# Patient Record
Sex: FEMALE | Race: WHITE | Employment: UNEMPLOYED | URBAN - METROPOLITAN AREA
[De-identification: names, ages, dates, MRNs, and addresses within clinical notes are randomized per-mention and may not be internally consistent; named-entity substitution may affect disease eponyms.]

---

## 2017-01-17 ENCOUNTER — TELEPHONE (OUTPATIENT)
Dept: INTERNAL MEDICINE CLINIC | Age: 57
End: 2017-01-17

## 2017-01-17 NOTE — TELEPHONE ENCOUNTER
1/17/17 Called  Attempted PA on Rizatriptan 10 mg PA rep Shelli Apley state no PA needed plan allows 18 tablets for 25 days or 9 tablets for 9  days,claim was run too soon ,need to process claim today. Shravan called spoke with susan Holder claim went thru only 3 tablets in stock,patient will be called by Glory.

## 2017-02-13 RX ORDER — RIZATRIPTAN BENZOATE 10 MG/1
TABLET ORAL
Qty: 9 TAB | Refills: 3 | Status: SHIPPED | OUTPATIENT
Start: 2017-02-13 | End: 2017-05-25 | Stop reason: SDUPTHER

## 2017-02-13 NOTE — TELEPHONE ENCOUNTER
Requested Prescriptions     Pending Prescriptions Disp Refills    rizatriptan (MAXALT) 10 mg tablet 9 Tab 3     Sig: May repeat in 2 hours if needed     Last OV-7/27/16  Next OV-n/s  Med refilled-11/25/16

## 2017-04-23 DIAGNOSIS — E03.9 ACQUIRED HYPOTHYROIDISM: ICD-10-CM

## 2017-04-23 RX ORDER — THYROID, PORCINE 30 MG/1
TABLET ORAL
Qty: 30 TAB | Refills: 0 | Status: SHIPPED | OUTPATIENT
Start: 2017-04-23 | End: 2018-01-18

## 2017-05-25 RX ORDER — RIZATRIPTAN BENZOATE 10 MG/1
TABLET ORAL
Qty: 9 TAB | Refills: 3 | Status: SHIPPED | OUTPATIENT
Start: 2017-05-25 | End: 2017-07-27

## 2017-06-19 ENCOUNTER — OFFICE VISIT (OUTPATIENT)
Dept: INTERNAL MEDICINE CLINIC | Age: 57
End: 2017-06-19

## 2017-06-19 VITALS
HEART RATE: 100 BPM | OXYGEN SATURATION: 99 % | HEIGHT: 66 IN | SYSTOLIC BLOOD PRESSURE: 123 MMHG | BODY MASS INDEX: 22.02 KG/M2 | RESPIRATION RATE: 18 BRPM | TEMPERATURE: 98.1 F | WEIGHT: 137 LBS | DIASTOLIC BLOOD PRESSURE: 77 MMHG

## 2017-06-19 DIAGNOSIS — R31.9 HEMATURIA: ICD-10-CM

## 2017-06-19 DIAGNOSIS — R10.9 FLANK PAIN: Primary | ICD-10-CM

## 2017-06-19 LAB
BILIRUB UR QL STRIP: NEGATIVE
GLUCOSE UR-MCNC: NEGATIVE MG/DL
KETONES P FAST UR STRIP-MCNC: NEGATIVE MG/DL
PH UR STRIP: 7 [PH] (ref 4.6–8)
PROT UR QL STRIP: NEGATIVE MG/DL
SP GR UR STRIP: 1.02 (ref 1–1.03)
UA UROBILINOGEN AMB POC: NORMAL (ref 0.2–1)
URINALYSIS CLARITY POC: CLEAR
URINALYSIS COLOR POC: YELLOW
URINE BLOOD POC: NORMAL
URINE LEUKOCYTES POC: NEGATIVE
URINE NITRITES POC: NEGATIVE

## 2017-06-19 NOTE — MR AVS SNAPSHOT
Visit Information Date & Time Provider Department Dept. Phone Encounter #  
 6/19/2017  2:45 PM Toan Morrissey [address] [place] [address],[address] [phone] [id number] Follow-up Instructions Return if symptoms worsen or fail to improve. Your Appointments 7/27/2017  2:30 PM  
PHYSICAL PRE OP with June Jang MD  
[facility] [place]) Appt Note: annual exam  
 [facility] [address] [address]  
[address] [facility] [address] [place] [address] Upcoming Health Maintenance Date Due Hepatitis C Screening 1960 DTaP/Tdap/Td series (1 - Tdap) 4/21/1981 PAP AKA CERVICAL CYTOLOGY 4/21/1981 FOBT Q 1 YEAR AGE 50-75 4/21/2010 BREAST CANCER SCRN MAMMOGRAM 8/26/2016 INFLUENZA AGE 9 TO ADULT 8/1/2017 Allergies as of 6/19/2017  Review Complete On: 6/19/2017 By: Dani Patient Severity Noted Reaction Type Reactions Adhesive  08/03/2012    Hives Folic Acid  17/04/7498    Other (comments) Chest pain  
 Sulfa (Sulfonamide Antibiotics)  12/15/2014    Nausea and Vomiting Sulfites  07/27/2016   Intolerance Other (comments) Migraine Current Immunizations  Never Reviewed No immunizations on file. Not reviewed this visit You Were Diagnosed With   
  
 Codes Comments Flank pain    -  Primary ICD-10-CM: R10.9 ICD-9-CM: 789.09 Hematuria     ICD-10-CM: R31.9 ICD-9-CM: 599.70 Vitals BP Pulse Temp Resp Height(growth percentile) Weight(growth percentile) 123/77 (BP 1 Location: Right arm, BP Patient Position: Sitting) 100 98.1 °F (36.7 °C) (Oral) 18 5' 5.5\" (1.664 m) 137 lb (62.1 kg) LMP SpO2 BMI OB Status Smoking Status 01/14/2013 99% 22.45 kg/m2 Hysterectomy Former Smoker Vitals History BMI and BSA Data Body Mass Index Body Surface Area  
 22.45 kg/m 2 1.69 m 2 Preferred Pharmacy Pharmacy Name Phone [phone] [phone] Your Updated Medication List  
  
   
This list is accurate as of: 6/19/17  3:11 PM.  Always use your most recent med list.  
  
  
  
  
 magnesium 250 mg Tab Take  by mouth. OTHER(NON-FORMULARY)  
  
 rizatriptan 10 mg tablet Commonly known as:  MAXALT  
take 1 tablet by mouth immediately FOR MIGRAINES may repeat after 2 hours * thyroid (Pork) 15 mg tablet Commonly known as:  ARMOUR THYROID Take 1 Tab by mouth daily. Take one tablet twice a week, in addition to the 30mg tablet * ARMOUR THYROID 30 mg tablet Generic drug:  thyroid (Pork)  
take 1 tablet once daily VITAMIN C 500 mg tablet Generic drug:  ascorbic acid (vitamin C) Take  by mouth. * Notice: This list has 2 medication(s) that are the same as other medications prescribed for you. Read the directions carefully, and ask your doctor or other care provider to review them with you. We Performed the Following AMB POC URINALYSIS DIP STICK AUTO W/O MICRO [44790 CPT(R)] CBC WITH AUTOMATED DIFF [81467 CPT(R)] METABOLIC PANEL, COMPREHENSIVE [75127 CPT(R)] Follow-up Instructions Return if symptoms worsen or fail to improve. To-Do List   
 06/19/2017 Imaging:  CT PELV W WO CONT Referral Information Referral ID Referred By Referred To  
  
 [id number] [facility] [place] Not Available Visits Status Start Date End Date 1 New Request 6/19/17 6/19/18 If your referral has a status of pending review or denied, additional information will be sent to support the outcome of this decision. Patient Instructions Blood in the Urine: Care Instructions Your Care Instructions Blood in the urine, or hematuria, may make the urine look red, brown, or pink. There may be blood every time you urinate or just from time to time. You cannot always see blood in the urine, but it will show up in a urine test. 
Blood in the urine may be serious. It should always be checked by a doctor. Your doctor may recommend more tests, including an X-ray, a CT scan, or a cystoscopy (which lets a doctor look inside the urethra and bladder). Blood in the urine can be a sign of another problem. Common causes are bladder infections and kidney stones. An injury to your groin or your genital area can also cause bleeding in the urinary tract. Very hard exercisesuch as running a marathoncan cause blood in the urine. Blood in the urine can also be a sign of kidney disease or cancer in the bladder or kidney. Many cases of blood in the urine are caused by a harmless condition that runs in families. This is called benign familial hematuria. It does not need any treatment. Sometimes your urine may look red or brown even though it does not contain blood. For example, not getting enough fluids (dehydration), taking certain medicines, or having a liver problem can change the color of your urine. Eating foods such as beets, rhubarb, or blackberries or foods with red food coloring can make your urine look red or pink. Follow-up care is a key part of your treatment and safety. Be sure to make and go to all appointments, and call your doctor if you are having problems. It's also a good idea to know your test results and keep a list of the medicines you take. When should you call for help? Call your doctor now or seek immediate medical care if: 
· You have symptoms of a urinary infection. For example: ¨ You have pus in your urine. ¨ You have pain in your back just below your rib cage. This is called flank pain. ¨ You have a fever, chills, or body aches. ¨ It hurts to urinate. ¨ You have groin or belly pain. · You have more blood in your urine. Watch closely for changes in your health, and be sure to contact your doctor if: 
· You have new urination problems. · You do not get better as expected. Where can you learn more? Go to http://ashley-cornelia.info/. Enter G649 in the search box to learn more about \"Blood in the Urine: Care Instructions. \" Current as of: August 12, 2016 Content Version: 11.2 © 2170-6219 [employer]. Care instructions adapted under license by [employer] (which disclaims liability or warranty for this information). If you have questions about a medical condition or this instruction, always ask your healthcare professional. [address] any warranty or liability for your use of this information. 
 
-------------------------------------------------- 
Repeat urinalysis at follow up appointment Introducing [facility] SERVICES! Dear Shyam Trinh: 
Thank you for requesting a [employer] account. Our records indicate that you already have an active [employer] account. You can access your account anytime at [email or web address] Did you know that you can access your hospital and ER discharge instructions at any time in [employer]? You can also review all of your test results from your hospital stay or ER visit. Additional Information If you have questions, please visit the Frequently Asked Questions section of the [employer] website at [email or web address] Remember, [employer] is NOT to be used for urgent needs. For medical emergencies, dial 911. Now available from your iPhone and Android! Please provide this summary of care documentation to your next provider. Your primary care clinician is listed as Mary Matt. If you have any questions after today's visit, please call [phone].

## 2017-06-19 NOTE — PROGRESS NOTES
Chief Complaint   Patient presents with    Flank Pain     x2 week     1. Have you been to the ER, urgent care clinic since your last visit? Hospitalized since your last visit? No    2. Have you seen or consulted any other health care providers outside of the [address] [place] [address] since your last visit? Include any pap smears or colon screening.  No

## 2017-06-19 NOTE — PROGRESS NOTES
CC: Flank Pain (x2 week)      HPI:    She is a 62 y.o. female who presents for evaluation of flank pain  B/L -   Moving exacerbates pain and lies down makes it worst  Pain is constant and sharp and present for 2 weeks   No burning with urination. Appetite is unchanged but has mild nausea    Had trace blood on UA   Previous kidney US done in 2016 and normal   Denies MSK injury   has not taken any medications for this     ROS:  Constitutional: negative for fevers, chills, anorexia and weight loss  Eyes:   negative for visual disturbance,  irritation  ENT:   negative for tinnitus,sore throat,nasal congestion,ear pain, sinus pain. 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 Take one tablet twice a week, in addition to the 30mg tablet 8 Tab 1    ascorbic acid (VITAMIN C) 500 mg tablet Take  by mouth.  magnesium 250 mg Tab Take  by mouth.  OTHER,NON-FORMULARY,        No current facility-administered medications on file prior to visit.         Past Surgical History:   Procedure Laterality Date    HX BREAST AUGMENTATION  2000    saline implants    HX BREAST AUGMENTATION  6422    silicone implants    HX BREAST AUGMENTATION Bilateral 12/18/2014    REMOVAL OF BILATERAL BREAST IMPLANTS, CAPSULECTOMY, LIPOSUCTION OF ABDOMEN, FLANK, THIGHS WITH FAT TRANSFER TO BREASTS performed by Diane Pritchett MD at [address] HX COLONOSCOPY      colon polyps    HX HEENT      CHIN REDUCTION    HX LATRICE AND BSO  2012       Family History   Problem Relation Age of Onset    Cancer Father      ALL OVER     Reviewed and no changes     Social History     Social History    Marital status:      Spouse name: N/A    Number of children: N/A    Years of education: N/A     Occupational History    Not on file.      Social History Main Topics    Smoking status: Former Smoker     Packs/day: 0.50     Years: 30.00    Smokeless tobacco: Never Used    Alcohol use No    Drug use: No    Sexual activity: Not on file     Other Topics Concern    Not on file     Social History Narrative            Visit Vitals    /77 (BP 1 Location: Right arm, BP Patient Position: Sitting)    Pulse 100    Temp 98.1 °F (36.7 °C) (Oral)    Resp 18    Ht 5' 5.5\" (1.664 m)    Wt 137 lb (62.1 kg)    LMP 01/14/2013    SpO2 99%    BMI 22.45 kg/m2       Physical Examination:   General - Well appearing female  HEENT - PERRL, TM no erythema/opacification, normal nasal turbinates, oropharynx no erythema or exudate, MMM  Neck - supple, no bruits, no TMG, no LAD  Pulm - clear to auscultation bilaterally  Cardio - RRR, normal S1 S2, no murmur gallops or rubs  Abd - soft, nontender, no masses, no HSM  Mild CVA tenderness noted B/L   Extrem - no edema, +2 distal pulses  Psych - normal affect, appropriate mood    Lab Results   Component Value Date/Time    WBC 5.0 11/25/2015 04:17 PM    HGB 13.8 11/25/2015 04:17 PM    HCT 40.8 11/25/2015 04:17 PM    PLATELET 456 36/86/5312 04:17 PM    MCV 87 11/25/2015 04:17 PM     Lab Results   Component Value Date/Time    Sodium 143 11/25/2015 04:17 PM    Potassium 4.9 11/25/2015 04:17 PM    Chloride 101 11/25/2015 04:17 PM    CO2 28 11/25/2015 04:17 PM    Anion gap 7 08/03/2012 10:27 AM Glucose 91 11/25/2015 04:17 PM    BUN 12 11/25/2015 04:17 PM    Creatinine 0.81 11/25/2015 04:17 PM    BUN/Creatinine ratio 15 11/25/2015 04:17 PM    GFR est AA 95 11/25/2015 04:17 PM    GFR est non-AA 82 11/25/2015 04:17 PM    Calcium 9.7 11/25/2015 04:17 PM     Lab Results   Component Value Date/Time    Cholesterol, total 293 03/14/2013 09:41 AM    HDL Cholesterol 55 03/14/2013 09:41 AM    LDL, calculated 202 03/14/2013 09:41 AM    VLDL, calculated 36 03/14/2013 09:41 AM    Triglyceride 182 03/14/2013 09:41 AM     Lab Results   Component Value Date/Time    TSH 4.730 09/22/2016 03:24 PM     No results found for: PSA, PSA2, PSAR1, Radene Picking, [id number], [id number], PSALT  Lab Results   Component Value Date/Time    Hemoglobin A1c 5.9 09/11/2012 12:40 PM     Lab Results   Component Value Date/Time    VITAMIN D, 25-HYDROXY 52.9 03/14/2013 09:41 AM       Lab Results   Component Value Date/Time    ALT (SGPT) 11 11/25/2015 04:17 PM    AST (SGOT) 18 11/25/2015 04:17 PM    Alk. phosphatase 60 11/25/2015 04:17 PM    Bilirubin, total 0.4 11/25/2015 04:17 PM           Assessment/Plan:    1. Flank pain with hematuria   - concerning for possible renal stones will obtain CT scan for further evaluation   - also obtaining general labs   - METABOLIC PANEL, COMPREHENSIVE  - CBC WITH AUTOMATED DIFF  - AMB POC URINALYSIS DIP STICK AUTO W/O MICRO  - CT ABD PELV WO CONT; Future    2. Hematuria  - if above work is negative patient will need further investigation for hematuria  - advised to repeat UA at follow up appointment with PCP next month   L  Follow-up Disposition:  Return if symptoms worsen or fail to improve.     Cali Landin MD

## 2017-06-19 NOTE — PATIENT INSTRUCTIONS

## 2017-06-20 LAB
ALBUMIN SERPL-MCNC: 4.7 G/DL (ref 3.5–5.5)
ALBUMIN/GLOB SERPL: 2 {RATIO} (ref 1.2–2.2)
ALP SERPL-CCNC: 63 IU/L (ref 39–117)
ALT SERPL-CCNC: 17 IU/L (ref 0–32)
AST SERPL-CCNC: 22 IU/L (ref 0–40)
BASOPHILS # BLD AUTO: 0 X10E3/UL (ref 0–0.2)
BASOPHILS NFR BLD AUTO: 1 %
BILIRUB SERPL-MCNC: 0.5 MG/DL (ref 0–1.2)
BUN SERPL-MCNC: 11 MG/DL (ref 6–24)
BUN/CREAT SERPL: 14 (ref 9–23)
CALCIUM SERPL-MCNC: 9.6 MG/DL (ref 8.7–10.2)
CHLORIDE SERPL-SCNC: 101 MMOL/L (ref 96–106)
CO2 SERPL-SCNC: 24 MMOL/L (ref 18–29)
CREAT SERPL-MCNC: 0.81 MG/DL (ref 0.57–1)
EOSINOPHIL # BLD AUTO: 0.1 X10E3/UL (ref 0–0.4)
EOSINOPHIL NFR BLD AUTO: 2 %
ERYTHROCYTE [DISTWIDTH] IN BLOOD BY AUTOMATED COUNT: 14.8 % (ref 12.3–15.4)
GLOBULIN SER CALC-MCNC: 2.4 G/DL (ref 1.5–4.5)
GLUCOSE SERPL-MCNC: 89 MG/DL (ref 65–99)
HCT VFR BLD AUTO: 42.2 % (ref 34–46.6)
HGB BLD-MCNC: 14.3 G/DL (ref 11.1–15.9)
IMM GRANULOCYTES # BLD: 0 X10E3/UL (ref 0–0.1)
IMM GRANULOCYTES NFR BLD: 0 %
LYMPHOCYTES # BLD AUTO: 2.7 X10E3/UL (ref 0.7–3.1)
LYMPHOCYTES NFR BLD AUTO: 45 %
MCH RBC QN AUTO: 29 PG (ref 26.6–33)
MCHC RBC AUTO-ENTMCNC: 33.9 G/DL (ref 31.5–35.7)
MCV RBC AUTO: 86 FL (ref 79–97)
MONOCYTES # BLD AUTO: 0.4 X10E3/UL (ref 0.1–0.9)
MONOCYTES NFR BLD AUTO: 7 %
NEUTROPHILS # BLD AUTO: 2.6 X10E3/UL (ref 1.4–7)
NEUTROPHILS NFR BLD AUTO: 45 %
PLATELET # BLD AUTO: 225 X10E3/UL (ref 150–379)
POTASSIUM SERPL-SCNC: 4.4 MMOL/L (ref 3.5–5.2)
PROT SERPL-MCNC: 7.1 G/DL (ref 6–8.5)
RBC # BLD AUTO: 4.93 X10E6/UL (ref 3.77–5.28)
SODIUM SERPL-SCNC: 141 MMOL/L (ref 134–144)
WBC # BLD AUTO: 5.8 X10E3/UL (ref 3.4–10.8)

## 2017-06-21 ENCOUNTER — HOSPITAL ENCOUNTER (OUTPATIENT)
Dept: CT IMAGING | Age: 57
Discharge: HOME | End: 2017-06-21
Attending: INTERNAL MEDICINE
Payer: COMMERCIAL

## 2017-06-21 DIAGNOSIS — R31.9 HEMATURIA: ICD-10-CM

## 2017-06-21 DIAGNOSIS — R10.9 FLANK PAIN: ICD-10-CM

## 2017-06-21 PROCEDURE — 74176 CT ABD & PELVIS W/O CONTRAST: CPT

## 2017-06-22 NOTE — PROGRESS NOTES
No renal stones. Normal pelvic and abdominal area  L3-L4 degenerative disc disease which is likely reason for back pain. Patient can take tylenol alternating with NSAIDs  Such as Motrin for pain . Noted to have atherosclerosis on aorta ( large blood vessel in abdomen) which caused by cholesterol /plaque build up. This is an incidental finding and common at this age.  Recommend healthy diet and daily exercise

## 2017-06-26 NOTE — PROGRESS NOTES
Call to pt, ID verified x2. Results reviewed. Pt verbalized understanding and stated that she sent a message through [email or web address] this afternoon stating her back pain has increased in intensity and duration. Message was forwarded to her PCP Dr. Layla Ruiz for review. Pt had no further questions or concerns.

## 2017-07-27 ENCOUNTER — OFFICE VISIT (OUTPATIENT)
Dept: INTERNAL MEDICINE CLINIC | Age: 57
End: 2017-07-27

## 2017-07-27 VITALS
HEART RATE: 81 BPM | HEIGHT: 66 IN | OXYGEN SATURATION: 97 % | DIASTOLIC BLOOD PRESSURE: 69 MMHG | RESPIRATION RATE: 18 BRPM | WEIGHT: 135.8 LBS | SYSTOLIC BLOOD PRESSURE: 105 MMHG | BODY MASS INDEX: 21.83 KG/M2 | TEMPERATURE: 97.8 F

## 2017-07-27 DIAGNOSIS — E03.9 ACQUIRED HYPOTHYROIDISM: ICD-10-CM

## 2017-07-27 DIAGNOSIS — Z00.00 ROUTINE MEDICAL EXAM: Primary | ICD-10-CM

## 2017-07-27 DIAGNOSIS — Z12.11 SCREENING FOR COLON CANCER: ICD-10-CM

## 2017-07-27 DIAGNOSIS — F51.01 PRIMARY INSOMNIA: ICD-10-CM

## 2017-07-27 DIAGNOSIS — G43.909 MIGRAINE WITHOUT STATUS MIGRAINOSUS, NOT INTRACTABLE, UNSPECIFIED MIGRAINE TYPE: ICD-10-CM

## 2017-07-27 DIAGNOSIS — K63.5 POLYP OF COLON, UNSPECIFIED PART OF COLON, UNSPECIFIED TYPE: ICD-10-CM

## 2017-07-27 DIAGNOSIS — M54.5 LOW BACK PAIN, UNSPECIFIED BACK PAIN LATERALITY, UNSPECIFIED CHRONICITY, WITH SCIATICA PRESENCE UNSPECIFIED: ICD-10-CM

## 2017-07-27 RX ORDER — DIAZEPAM 5 MG/1
TABLET ORAL
Qty: 30 TAB | Refills: 1 | Status: SHIPPED | OUTPATIENT
Start: 2017-07-27 | End: 2017-09-22 | Stop reason: SDUPTHER

## 2017-07-27 RX ORDER — MULTIVIT WITH IRON,MINERALS
400 TABLET ORAL EVERY OTHER DAY
COMMUNITY

## 2017-07-27 RX ORDER — RIZATRIPTAN BENZOATE 10 MG/1
TABLET, ORALLY DISINTEGRATING ORAL
Qty: 12 TAB | Refills: 3 | Status: SHIPPED | OUTPATIENT
Start: 2017-07-27 | End: 2017-12-19 | Stop reason: SDUPTHER

## 2017-07-27 NOTE — MR AVS SNAPSHOT
Visit Information Date & Time Provider Department Dept. Phone Encounter #  
 7/27/2017  2:30 PM Paula Cruz, [address] [place] [address],[address] [phone] [id number] Follow-up Instructions Return for follow up as needed and annual.  . Upcoming Health Maintenance Date Due Hepatitis C Screening 1960 DTaP/Tdap/Td series (1 - Tdap) 4/21/1981 PAP AKA CERVICAL CYTOLOGY 4/21/1981 FOBT Q 1 YEAR AGE 50-75 4/21/2010 BREAST CANCER SCRN MAMMOGRAM 8/26/2016 INFLUENZA AGE 9 TO ADULT 8/1/2017 Allergies as of 7/27/2017  Review Complete On: 7/27/2017 By: Willie RINCON Rash, LPN Severity Noted Reaction Type Reactions Adhesive  08/03/2012    Hives Folic Acid  60/02/8886    Other (comments) Chest pain  
 Sulfa (Sulfonamide Antibiotics)  12/15/2014    Nausea and Vomiting Sulfites  07/27/2016   Intolerance Other (comments) Migraine Current Immunizations  Never Reviewed No immunizations on file. Not reviewed this visit You Were Diagnosed With   
  
 Codes Comments Routine medical exam    -  Primary ICD-10-CM: Z00.00 ICD-9-CM: V70.0 Primary insomnia     ICD-10-CM: F51.01 
ICD-9-CM: 307.42 Low back pain, unspecified back pain laterality, unspecified chronicity, with sciatica presence unspecified     ICD-10-CM: M54.5 ICD-9-CM: 724.2 Screening for colon cancer     ICD-10-CM: Z12.11 ICD-9-CM: V76.51 Polyp of colon, unspecified part of colon, unspecified type     ICD-10-CM: K63.5 ICD-9-CM: 211.3 Acquired hypothyroidism     ICD-10-CM: E03.9 ICD-9-CM: 244.9 Migraine without status migrainosus, not intractable, unspecified migraine type     ICD-10-CM: G43.909 ICD-9-CM: 346.90 Vitals BP Pulse Temp Resp Height(growth percentile) Weight(growth percentile) 105/69 (BP 1 Location: Left arm, BP Patient Position: Sitting) 81 97.8 °F (36.6 °C) (Oral) 18 5' 5.5\" (1.664 m) 135 lb 12.8 oz (61.6 kg) LMP SpO2 BMI OB Status Smoking Status 01/14/2013 97% 22.25 kg/m2 Hysterectomy Former Smoker BMI and BSA Data Body Mass Index Body Surface Area  
 22.25 kg/m 2 1.69 m 2 Preferred Pharmacy Pharmacy Name Phone [id number] [id number] [phone] Your Updated Medication List  
  
   
This list is accurate as of: 7/27/17  3:59 PM.  Always use your most recent med list.  
  
  
  
  
 diazePAM 5 mg tablet Commonly known as:  VALIUM Take one tablet at bedtime as needed for insomnia or back pain.  
  
 magnesium 250 mg Tab Take  by mouth. nicotinic acid 100 mg tablet Commonly known as:  NIACIN Take 400 mg by mouth every other day. OTHER(NON-FORMULARY)  
  
 rizatriptan 10 mg disintegrating tablet Commonly known as:  MAXALT-MLT Take one tablet at onset of headache, repeat once at 2 hours, max of 2 in 24 hours. * thyroid (Pork) 15 mg tablet Commonly known as:  ARMOUR THYROID Take 1 Tab by mouth daily. Take one tablet twice a week, in addition to the 30mg tablet * ARMOUR THYROID 30 mg tablet Generic drug:  thyroid (Pork)  
take 1 tablet once daily VITAMIN C 500 mg tablet Generic drug:  ascorbic acid (vitamin C) Take  by mouth. * Notice: This list has 2 medication(s) that are the same as other medications prescribed for you. Read the directions carefully, and ask your doctor or other care provider to review them with you. Prescriptions Printed Refills  
 diazePAM (VALIUM) 5 mg tablet 1 Sig: Take one tablet at bedtime as needed for insomnia or back pain. Class: Print Prescriptions Sent to Pharmacy Refills  
 rizatriptan (MAXALT-MLT) 10 mg disintegrating tablet 3 Sig: Take one tablet at onset of headache, repeat once at 2 hours, max of 2 in 24 hours.   
 Class: Normal  
 Pharmacy: [address] [place] [address] [facility] [phone]  #: [phone] We Performed the Following REFERRAL TO GASTROENTEROLOGY [HIG96 Custom] Comments:  
 Colon screening, prior polyps. REFERRAL TO NEUROLOGY [XOV56 Custom] Comments:  
 Chronic migraine. Follow-up Instructions Return for follow up as needed and annual.  . Referral Information Referral ID Referred By Referred To  
  
 [id number] [facility] [address] [place], [address] [facility] Visits Status Start Date End Date 1 New Request 7/27/17 7/27/18 If your referral has a status of pending review or denied, additional information will be sent to support the outcome of this decision. Referral ID Referred By Referred To  
 [id number] Estrella DONALDSON [facility] [address] [place] [address] Phone: [phone] Fax: [phone] Visits Status Start Date End Date 1 New Request 7/27/17 7/27/18 If your referral has a status of pending review or denied, additional information will be sent to support the outcome of this decision. Patient Instructions Low Back Pain: Exercises Your Care Instructions Here are some examples of typical rehabilitation exercises for your condition. Start each exercise slowly. Ease off the exercise if you start to have pain. Your doctor or physical therapist will tell you when you can start these exercises and which ones will work best for you. How to do the exercises Press-up 1. Lie on your stomach, supporting your body with your forearms. 2. Press your elbows down into the floor to raise your upper back. As you do this, relax your stomach muscles and allow your back to arch without using your back muscles. As your press up, do not let your hips or pelvis come off the floor. 3. Hold for 15 to 30 seconds, then relax. 4. Repeat 2 to 4 times. Alternate arm and leg (bird dog) exercise Note: Do this exercise slowly. Try to keep your body straight at all times, and do not let one hip drop lower than the other. 1. Start on the floor, on your hands and knees. 2. Tighten your belly muscles. 3. Raise one leg off the floor, and hold it straight out behind you. Be careful not to let your hip drop down, because that will twist your trunk. 4. Hold for about 6 seconds, then lower your leg and switch to the other leg. 5. Repeat 8 to 12 times on each leg. 6. Over time, work up to holding for 10 to 30 seconds each time. 7. If you feel stable and secure with your leg raised, try raising the opposite arm straight out in front of you at the same time. Knee-to-chest exercise 1. Lie on your back with your knees bent and your feet flat on the floor. 2. Bring one knee to your chest, keeping the other foot flat on the floor (or keeping the other leg straight, whichever feels better on your lower back). 3. Keep your lower back pressed to the floor. Hold for at least 15 to 30 seconds. 4. Relax, and lower the knee to the starting position. 5. Repeat with the other leg. Repeat 2 to 4 times with each leg. 6. To get more stretch, put your other leg flat on the floor while pulling your knee to your chest. 
Curl-ups 1. Lie on the floor on your back with your knees bent at a 90-degree angle. Your feet should be flat on the floor, about 12 inches from your buttocks. 2. Cross your arms over your chest. If this bothers your neck, try putting your hands behind your neck (not your head), with your elbows spread apart. 3. Slowly tighten your belly muscles and raise your shoulder blades off the floor. 4. Keep your head in line with your body, and do not press your chin to your chest. 
5. Hold this position for 1 or 2 seconds, then slowly lower yourself back down to the floor. 6. Repeat 8 to 12 times. Pelvic tilt exercise 1. Lie on your back with your knees bent. 2. \"Brace\" your stomach. This means to tighten your muscles by pulling in and imagining your belly button moving toward your spine. You should feel like your back is pressing to the floor and your hips and pelvis are rocking back. 3. Hold for about 6 seconds while you breathe smoothly. 4. Repeat 8 to 12 times. Heel dig bridging 1. Lie on your back with both knees bent and your ankles bent so that only your heels are digging into the floor. Your knees should be bent about 90 degrees. 2. Then push your heels into the floor, squeeze your buttocks, and lift your hips off the floor until your shoulders, hips, and knees are all in a straight line. 3. Hold for about 6 seconds as you continue to breathe normally, and then slowly lower your hips back down to the floor and rest for up to 10 seconds. 4. Do 8 to 12 repetitions. Hamstring stretch in doorway 1. Lie on your back in a doorway, with one leg through the open door. 2. Slide your leg up the wall to straighten your knee. You should feel a gentle stretch down the back of your leg. 3. Hold the stretch for at least 15 to 30 seconds. Do not arch your back, point your toes, or bend either knee. Keep one heel touching the floor and the other heel touching the wall. 4. Repeat with your other leg. 5. Do 2 to 4 times for each leg. Hip flexor stretch 1. Kneel on the floor with one knee bent and one leg behind you. Place your forward knee over your foot. Keep your other knee touching the floor. 2. Slowly push your hips forward until you feel a stretch in the upper thigh of your rear leg. 3. Hold the stretch for at least 15 to 30 seconds. Repeat with your other leg. 4. Do 2 to 4 times on each side. Wall sit 1. Stand with your back 10 to 12 inches away from a wall. 2. Lean into the wall until your back is flat against it. 3. Slowly slide down until your knees are slightly bent, pressing your lower back into the wall. 4. Hold for about 6 seconds, then slide back up the wall. 5. Repeat 8 to 12 times. Follow-up care is a key part of your treatment and safety. Be sure to make and go to all appointments, and call your doctor if you are having problems. It's also a good idea to know your test results and keep a list of the medicines you take. Where can you learn more? Go to http://ashley-cornelia.info/. Enter I736 in the search box to learn more about \"Low Back Pain: Exercises. \" Current as of: March 21, 2017 Content Version: 11.3 © 9849-5683 [employer]. Care instructions adapted under license by [employer] (which disclaims liability or warranty for this information). If you have questions about a medical condition or this instruction, always ask your healthcare professional. [address] any warranty or liability for your use of this information. Introducing [facility]! Dear Yanci Cruz: 
Thank you for requesting a Rew[employer] account. Our records indicate that you already have an active [email or web address] account. You can access your account anytime at [email or web address] Did you know that you can access your hospital and ER discharge instructions at any time in [email or web address]? You can also review all of your test results from your hospital stay or ER visit. Additional Information If you have questions, please visit the Frequently Asked Questions section of the [email or web address] website at [email or web address] Remember, [email or web address] is NOT to be used for urgent needs. For medical emergencies, dial 911. Now available from your iPhone and Android! Please provide this summary of care documentation to your next provider. Your primary care clinician is listed as Loyd Murray. If you have any questions after today's visit, please call [phone].

## 2017-07-27 NOTE — PATIENT INSTRUCTIONS

## 2017-07-27 NOTE — PROGRESS NOTES
Michelle Lua is a 62 y.o. female  Chief Complaint   Patient presents with    Medication Evaluation     1. Have you been to an emergency room, urgent clinic, or hospitalized since your last visit? NO  If yes, where when, and reason for visit? 2. Have seen or consulted any other health care provider since your last visit? NO  Please include any pap smears or colon screening in this section  If yes, where when, and reason for visit? 6. Do you have an Advanced Directive/ Living Will in place?  YES  If yes, do we have a copy on file YES  If no, would you like information NO

## 2017-07-27 NOTE — PROGRESS NOTES
Pertinent items are noted in HPI. Dougie Wiseman is a 62 y.o. female who presents for annual exam.     Taking nyquil for insomnia. Prior ambien, tolerated. Has back pain, saw chiropractor. No sciatica. Not taking any medication for the pain now. Sometimes it will keep her from sleeping. Can sleep on the day she gets treatment for back pain. Has taken valium for dental anxiety. Doing some stretching regularly, yoga. Treated for hypothyroidism. Reports compliance with medication. Sees ron Andre,  135mg, every 3 months. Normal bowel and bladder. Prior colon polyps, she states she is due for a repeat colon screening. Refuses mammogram.  No family history of BRCA. Chronic neck pain. Has migraines, takes maxalt, effective. Wants to switch to MLT. Prior preventive medications for migraine, not helpful, last neuro evaluation 2015. , refused statins. Low fat diet. Active regularly. 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      Social History Main Topics    Smoking status: Former Smoker     Packs/day: 0.50     Years: 30.00    Smokeless tobacco: Never Used    Alcohol use No    Drug use: No    Sexual activity: Not on file     Other Topics Concern    Not on file     Social History Narrative       Current Outpatient Prescriptions on File Prior to Visit   Medication Sig Dispense Refill    rizatriptan (MAXALT) 10 mg tablet take 1 tablet by mouth immediately FOR MIGRAINES may repeat after 2 hours 9 Tab 3    ARMOUR THYROID 30 mg tablet take 1 tablet once daily 30 Tab 0    thyroid, Pork, (ARMOUR THYROID) 15 mg tablet Take 1 Tab by mouth daily. Take one tablet twice a week, in addition to the 30mg tablet 8 Tab 1    ascorbic acid (VITAMIN C) 500 mg tablet Take  by mouth.  magnesium 250 mg Tab Take  by mouth.  OTHER,NON-FORMULARY,        No current facility-administered medications on file prior to visit. Review of Systems  Pertinent items are noted in HPI. Objective:     Visit Vitals    /69 (BP 1 Location: Left arm, BP Patient Position: Sitting)    Pulse 81    Temp 97.8 °F (36.6 °C) (Oral)    Resp 18    Ht 5' 5.5\" (1.664 m)    Wt 135 lb 12.8 oz (61.6 kg)    LMP 01/14/2013    SpO2 97%    BMI 22.25 kg/m2     Gen: well appearing female  HEENT:   PERRL,normal conjunctiva. External ear and canals normal, TMs no opacification or erythema,  OP no erythema, no exudates, MMM  Neck:  Supple. Thyroid normal size, nontender, without nodules. No masses or LAD  Resp:  No wheezing, no rhonchi, no rales. CV:  RRR, normal S1S2, no murmur. GI: soft, nontender, without masses. No hepatosplenomegaly. Extrem:  +2 pulses, no edema, warm distally      Assessment/Plan:       1. Primary insomnia    - diazePAM (VALIUM) 5 mg tablet; Take one tablet at bedtime as needed for insomnia or back pain. Dispense: 30 Tab; Refill: 1    2. Low back pain, unspecified back pain laterality, unspecified chronicity, with sciatica presence unspecified    - diazePAM (VALIUM) 5 mg tablet; Take one tablet at bedtime as needed for insomnia or back pain. Dispense: 30 Tab; Refill: 1    3. Screening for colon cancer    - REFERRAL TO GASTROENTEROLOGY    4. Polyp of colon, unspecified part of colon, unspecified type    - REFERRAL TO GASTROENTEROLOGY    5. Acquired hypothyroidism- follow up with endo      6.  Migraine without status migrainosus, not intractable, unspecified migraine type    - REFERRAL TO NEUROLOGY  - rizatriptan (MAXALT-MLT) 10 mg disintegrating tablet; Take one tablet at onset of headache, repeat once at 2 hours, max of 2 in 24 hours. Dispense: 12 Tab; Refill: 3    7. Routine medical exam- Recommend heart healthy diet and regular cardiovascular exercise.        Follow-up Disposition: follow up as needed and annual.      Elio Reyesr, MD

## 2017-09-22 DIAGNOSIS — F51.01 PRIMARY INSOMNIA: ICD-10-CM

## 2017-09-22 DIAGNOSIS — M54.5 LOW BACK PAIN, UNSPECIFIED BACK PAIN LATERALITY, UNSPECIFIED CHRONICITY, WITH SCIATICA PRESENCE UNSPECIFIED: ICD-10-CM

## 2017-09-22 RX ORDER — DIAZEPAM 5 MG/1
TABLET ORAL
Qty: 30 TAB | Refills: 1 | OUTPATIENT
Start: 2017-09-22 | End: 2017-10-23 | Stop reason: SDUPTHER

## 2017-09-22 NOTE — TELEPHONE ENCOUNTER
Requested Prescriptions     Pending Prescriptions Disp Refills    diazePAM (VALIUM) 5 mg tablet 30 Tab 1     Sig: Take one tablet at bedtime as needed for insomnia or back pain.      Last fill- 7/27/17  LOV- 7/27/17  Next OV- not scheduled

## 2017-09-22 NOTE — TELEPHONE ENCOUNTER
From: Jesica Preston  To: Chandrakant Young MD  Sent: 9/22/2017 10:20 AM EDT  Subject: Medication Renewal Request    Original authorizing provider: MD Jesica López would like a refill of the following medications:  diazePAM (VALIUM) 5 mg tablet Chandrakant Young MD]    Preferred pharmacy: [facility] - [address] [facility], [address] near [address] [place] [address]    Comment:

## 2017-09-25 NOTE — TELEPHONE ENCOUNTER
Approved Medications  diazePAM (VALIUM) 5 mg tablet  Take one tablet at bedtime as needed for insomnia or back pain. Disp: 30 Tab Refills: 1    Class: Phone In Start: 9/22/2017   For: Primary insomnia, Low back pain, unspecified back pain laterality, unspecified chronicity, with sciatica presence unspecified  Approved by: David Andrade MD  Phoned in Valium to [facility] pharmacy per  request. Luis Antonio Estrada with Mukul Oar who gave [place].

## 2017-10-23 DIAGNOSIS — M54.5 LOW BACK PAIN, UNSPECIFIED BACK PAIN LATERALITY, UNSPECIFIED CHRONICITY, WITH SCIATICA PRESENCE UNSPECIFIED: ICD-10-CM

## 2017-10-23 DIAGNOSIS — F51.01 PRIMARY INSOMNIA: ICD-10-CM

## 2017-10-23 NOTE — TELEPHONE ENCOUNTER
Cem Cordova, from [facility] on file, is requesting a refill authorization for \"Diazepam 5 mg\". Best contact [phone].        Message received & copied from [facility]

## 2017-10-24 RX ORDER — DIAZEPAM 5 MG/1
TABLET ORAL
Qty: 30 TAB | Refills: 1 | OUTPATIENT
Start: 2017-10-24 | End: 2018-01-02 | Stop reason: SDUPTHER

## 2017-10-24 NOTE — TELEPHONE ENCOUNTER
Approved Medications  diazePAM (VALIUM) 5 mg tablet  Take one tablet at bedtime as needed for insomnia or back pain.        Disp: 30 Tab Refills: 1    Class: Phone In Start: 10/24/2017   For: Primary insomnia, Low back pain, unspecified back pain laterality, unspecified chronicity, with sciatica presence unspecified  Approved by: Adi Morrison MD  Phoned in Valium to [facility] pharmacy per  request. JAMARCUS

## 2017-10-24 NOTE — TELEPHONE ENCOUNTER
Requested Prescriptions     Pending Prescriptions Disp Refills    diazePAM (VALIUM) 5 mg tablet 30 Tab 1     Sig: Take one tablet at bedtime as needed for insomnia or back pain.      Last fill- 9/22/17  LOV- 7/27/17  Next OV- not scheduled

## 2017-11-18 ENCOUNTER — APPOINTMENT (OUTPATIENT)
Dept: CT IMAGING | Age: 57
End: 2017-11-18
Attending: EMERGENCY MEDICINE
Payer: COMMERCIAL

## 2017-11-18 ENCOUNTER — APPOINTMENT (OUTPATIENT)
Dept: GENERAL RADIOLOGY | Age: 57
End: 2017-11-18
Attending: EMERGENCY MEDICINE
Payer: COMMERCIAL

## 2017-11-18 ENCOUNTER — HOSPITAL ENCOUNTER (EMERGENCY)
Age: 57
Discharge: HOME | End: 2017-11-18
Attending: EMERGENCY MEDICINE
Payer: COMMERCIAL

## 2017-11-18 VITALS
TEMPERATURE: 98.3 F | RESPIRATION RATE: 22 BRPM | BODY MASS INDEX: 21.69 KG/M2 | DIASTOLIC BLOOD PRESSURE: 55 MMHG | HEART RATE: 94 BPM | HEIGHT: 66 IN | WEIGHT: 135 LBS | OXYGEN SATURATION: 98 % | SYSTOLIC BLOOD PRESSURE: 103 MMHG

## 2017-11-18 DIAGNOSIS — R07.9 CHEST PAIN, UNSPECIFIED TYPE: Primary | ICD-10-CM

## 2017-11-18 LAB
ALBUMIN SERPL-MCNC: 3.7 G/DL (ref 3.5–5)
ALBUMIN/GLOB SERPL: 1.2 {RATIO} (ref 1.1–2.2)
ALP SERPL-CCNC: 54 U/L (ref 45–117)
ALT SERPL-CCNC: 23 U/L (ref 12–78)
ANION GAP SERPL CALC-SCNC: 6 MMOL/L (ref 5–15)
APPEARANCE UR: CLEAR
AST SERPL-CCNC: 17 U/L (ref 15–37)
BACTERIA URNS QL MICRO: NEGATIVE /HPF
BASOPHILS # BLD: 0 K/UL (ref 0–0.1)
BASOPHILS NFR BLD: 0 % (ref 0–1)
BILIRUB SERPL-MCNC: 0.5 MG/DL (ref 0.2–1)
BILIRUB UR QL: NEGATIVE
BUN SERPL-MCNC: 17 MG/DL (ref 6–20)
BUN/CREAT SERPL: 20 (ref 12–20)
CALCIUM SERPL-MCNC: 8.5 MG/DL (ref 8.5–10.1)
CHLORIDE SERPL-SCNC: 108 MMOL/L (ref 97–108)
CK SERPL-CCNC: 116 U/L (ref 26–192)
CO2 SERPL-SCNC: 28 MMOL/L (ref 21–32)
COLOR UR: ABNORMAL
CREAT SERPL-MCNC: 0.83 MG/DL (ref 0.55–1.02)
D DIMER PPP FEU-MCNC: <0.17 MG/L FEU (ref 0–0.65)
EOSINOPHIL # BLD: 0.1 K/UL (ref 0–0.4)
EOSINOPHIL NFR BLD: 1 % (ref 0–7)
EPITH CASTS URNS QL MICRO: ABNORMAL /LPF
ERYTHROCYTE [DISTWIDTH] IN BLOOD BY AUTOMATED COUNT: 12.3 % (ref 11.5–14.5)
GLOBULIN SER CALC-MCNC: 3.2 G/DL (ref 2–4)
GLUCOSE SERPL-MCNC: 90 MG/DL (ref 65–100)
GLUCOSE UR STRIP.AUTO-MCNC: NEGATIVE MG/DL
HCT VFR BLD AUTO: 38.7 % (ref 35–47)
HGB BLD-MCNC: 13.3 G/DL (ref 11.5–16)
HGB UR QL STRIP: ABNORMAL
HYALINE CASTS URNS QL MICRO: ABNORMAL /LPF (ref 0–5)
KETONES UR QL STRIP.AUTO: NEGATIVE MG/DL
LEUKOCYTE ESTERASE UR QL STRIP.AUTO: NEGATIVE
LIPASE SERPL-CCNC: 232 U/L (ref 73–393)
LYMPHOCYTES # BLD: 2.2 K/UL (ref 0.8–3.5)
LYMPHOCYTES NFR BLD: 41 % (ref 12–49)
MAGNESIUM SERPL-MCNC: 1.8 MG/DL (ref 1.6–2.4)
MCH RBC QN AUTO: 29.4 PG (ref 26–34)
MCHC RBC AUTO-ENTMCNC: 34.4 G/DL (ref 30–36.5)
MCV RBC AUTO: 85.4 FL (ref 80–99)
MONOCYTES # BLD: 0.3 K/UL (ref 0–1)
MONOCYTES NFR BLD: 6 % (ref 5–13)
NEUTS SEG # BLD: 2.7 K/UL (ref 1.8–8)
NEUTS SEG NFR BLD: 52 % (ref 32–75)
NITRITE UR QL STRIP.AUTO: NEGATIVE
PH UR STRIP: 6 [PH] (ref 5–8)
PLATELET # BLD AUTO: 195 K/UL (ref 150–400)
POTASSIUM SERPL-SCNC: 3.6 MMOL/L (ref 3.5–5.1)
PROT SERPL-MCNC: 6.9 G/DL (ref 6.4–8.2)
PROT UR STRIP-MCNC: NEGATIVE MG/DL
RBC # BLD AUTO: 4.53 M/UL (ref 3.8–5.2)
RBC #/AREA URNS HPF: ABNORMAL /HPF (ref 0–5)
SODIUM SERPL-SCNC: 142 MMOL/L (ref 136–145)
SP GR UR REFRACTOMETRY: 1.02 (ref 1–1.03)
TROPONIN I SERPL-MCNC: <0.04 NG/ML
TROPONIN I SERPL-MCNC: <0.04 NG/ML
UA: UC IF INDICATED,UAUC: ABNORMAL
UROBILINOGEN UR QL STRIP.AUTO: 0.2 EU/DL (ref 0.2–1)
WBC # BLD AUTO: 5.3 K/UL (ref 3.6–11)
WBC URNS QL MICRO: ABNORMAL /HPF (ref 0–4)

## 2017-11-18 PROCEDURE — 74011250636 HC RX REV CODE- 250/636: Performed by: EMERGENCY MEDICINE

## 2017-11-18 PROCEDURE — 82550 ASSAY OF CK (CPK): CPT | Performed by: EMERGENCY MEDICINE

## 2017-11-18 PROCEDURE — 85025 COMPLETE CBC W/AUTO DIFF WBC: CPT | Performed by: EMERGENCY MEDICINE

## 2017-11-18 PROCEDURE — 71275 CT ANGIOGRAPHY CHEST: CPT

## 2017-11-18 PROCEDURE — 85379 FIBRIN DEGRADATION QUANT: CPT | Performed by: EMERGENCY MEDICINE

## 2017-11-18 PROCEDURE — 83690 ASSAY OF LIPASE: CPT | Performed by: EMERGENCY MEDICINE

## 2017-11-18 PROCEDURE — 99284 EMERGENCY DEPT VISIT MOD MDM: CPT

## 2017-11-18 PROCEDURE — 80053 COMPREHEN METABOLIC PANEL: CPT | Performed by: EMERGENCY MEDICINE

## 2017-11-18 PROCEDURE — 71010 XR CHEST PORT: CPT

## 2017-11-18 PROCEDURE — 93005 ELECTROCARDIOGRAM TRACING: CPT

## 2017-11-18 PROCEDURE — 83735 ASSAY OF MAGNESIUM: CPT | Performed by: EMERGENCY MEDICINE

## 2017-11-18 PROCEDURE — 74011636320 HC RX REV CODE- 636/320: Performed by: EMERGENCY MEDICINE

## 2017-11-18 PROCEDURE — 84484 ASSAY OF TROPONIN QUANT: CPT | Performed by: EMERGENCY MEDICINE

## 2017-11-18 PROCEDURE — 36415 COLL VENOUS BLD VENIPUNCTURE: CPT | Performed by: EMERGENCY MEDICINE

## 2017-11-18 PROCEDURE — 81001 URINALYSIS AUTO W/SCOPE: CPT | Performed by: EMERGENCY MEDICINE

## 2017-11-18 RX ORDER — SODIUM CHLORIDE 0.9 % (FLUSH) 0.9 %
10 SYRINGE (ML) INJECTION
Status: COMPLETED | OUTPATIENT
Start: 2017-11-18 | End: 2017-11-18

## 2017-11-18 RX ORDER — SODIUM CHLORIDE 9 MG/ML
50 INJECTION, SOLUTION INTRAVENOUS
Status: COMPLETED | OUTPATIENT
Start: 2017-11-18 | End: 2017-11-18

## 2017-11-18 RX ORDER — SODIUM CHLORIDE 0.9 % (FLUSH) 0.9 %
5-10 SYRINGE (ML) INJECTION AS NEEDED
Status: DISCONTINUED | OUTPATIENT
Start: 2017-11-18 | End: 2017-11-18 | Stop reason: HOSPADM

## 2017-11-18 RX ORDER — SODIUM CHLORIDE 0.9 % (FLUSH) 0.9 %
5-10 SYRINGE (ML) INJECTION EVERY 8 HOURS
Status: DISCONTINUED | OUTPATIENT
Start: 2017-11-18 | End: 2017-11-18 | Stop reason: HOSPADM

## 2017-11-18 RX ORDER — GUAIFENESIN 100 MG/5ML
162 LIQUID (ML) ORAL DAILY
Qty: 100 TAB | Refills: 0 | Status: SHIPPED | OUTPATIENT
Start: 2017-11-18

## 2017-11-18 RX ADMIN — IOPAMIDOL 100 ML: 755 INJECTION, SOLUTION INTRAVENOUS at 15:49

## 2017-11-18 RX ADMIN — Medication 10 ML: at 15:51

## 2017-11-18 RX ADMIN — SODIUM CHLORIDE 50 ML/HR: 900 INJECTION, SOLUTION INTRAVENOUS at 15:51

## 2017-11-18 NOTE — ED PROVIDER NOTES
[facility].  EMERGENCY DEPARTMENT HISTORY AND PHYSICAL EXAM       Date of Service: 11/18/2017   Patient Name: Sonia Chang   YOB: 1960  Medical Record Number: [id number]    History of Presenting Illness     Chief Complaint   Patient presents with    Chest Pain     intermittent midsternal chest pain x 2 weeks, pt reports over the last 2 days pain has become more constant        History Provided By:  patient    Additional History:   Sonia Chang is a 62 y.o. female with PMhx significant for migraines, PAC's and PVC's who presents ambulatory to the ED with cc of intermittent mid-sternal chest pain x 2 weeks. She states the pain woke her from her sleep this morning and has been constant in nature ever since with BUE weakness. Pt reports the sensation feels as though it is traveling up into her neck with associated subjective SOB \"making it difficult to get my words out, like I'm winded\". She states she initially attributed her sx's to indigestion and endorses taking Gas-X and Tums wnr. Pt indicates she has been unable to detect any triggers of the pain and denies any correlation with eating. She denies any recent stressors that could contribute to her current sx's. She notes she had a cardiac work up including a halter test in 2010 and an echocardiogram last month ordered by her PCP. Pt denies hx of DVT/PE as well as recent travel, surgery, prolonged immobilization, and hormonal therapy. She denies any hx of abdominal, esophageal, or pulmonary pathology. Pt denies hx of AAA and denies any pulling/tearing back pain. Pt specifically denies any nausea and diaphoresis. Social Hx: - (former) Tobacco, - EtOH, - Illicit Drugs    There are no other complaints, changes or physical findings at this time.     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    Chronic pain 1970's    Neck pain    Migraine, cervicogenic 1970's    Neurological disorder     migraines        Past Surgical History:   Past Surgical History:   Procedure Laterality Date    HX BREAST AUGMENTATION  2000    saline implants    HX BREAST AUGMENTATION  7775    silicone implants    HX BREAST AUGMENTATION Bilateral 12/18/2014    REMOVAL OF BILATERAL BREAST IMPLANTS, CAPSULECTOMY, LIPOSUCTION OF ABDOMEN, FLANK, THIGHS WITH FAT TRANSFER TO BREASTS performed by Rita Best MD at [facility] [address] HX COLONOSCOPY      colon polyps    HX HEENT      CHIN REDUCTION    HX LATRICE AND BSO  2012        Family History:   Family History   Problem Relation Age of Onset    Cancer Father      metastatic, unknown primary        Social History:   Social History   Substance Use Topics    Smoking status: Former Smoker     Packs/day: 0.50     Years: 30.00    Smokeless tobacco: Never Used    Alcohol use No        Allergies: Allergies   Allergen Reactions    Adhesive Hives    Folic Acid Other (comments)     Chest pain    Sulfa (Sulfonamide Antibiotics) Nausea and Vomiting    Sulfites Other (comments)     Migraine         Review of Systems   Review of Systems   Constitutional: Negative for chills, diaphoresis and fever. HENT: Negative. Negative for congestion and rhinorrhea. Respiratory: Positive for shortness of breath (subjective). Negative for cough, chest tightness and wheezing. Cardiovascular: Positive for chest pain. Negative for palpitations. Gastrointestinal: Negative. Negative for abdominal pain, constipation, nausea and vomiting. Endocrine: Negative. Genitourinary: Negative. Negative for decreased urine volume, flank pain, hematuria and pelvic pain. Musculoskeletal: Positive for neck pain. Negative for back pain. Skin: Negative. Negative for color change, pallor and rash. Neurological: Positive for weakness (BUE).  Negative for dizziness, seizures, numbness and headaches. Hematological: Negative. Negative for adenopathy. Psychiatric/Behavioral: Negative. All other systems reviewed and are negative. Physical Exam  Physical Exam   Constitutional: She is oriented to person, place, and time. She appears well-developed and well-nourished. No distress. HENT:   Head: Normocephalic and atraumatic. Mouth/Throat: No oropharyngeal exudate. Eyes: Conjunctivae are normal. Pupils are equal, round, and reactive to light. Right eye exhibits no discharge. Left eye exhibits no discharge. No scleral icterus. Neck: Normal range of motion. Neck supple. No JVD present. Cardiovascular: Normal rate, regular rhythm, normal heart sounds and intact distal pulses. Exam reveals no gallop and no friction rub. No murmur heard. Pulmonary/Chest: Effort normal and breath sounds normal. No stridor. No respiratory distress. She has no wheezes. She has no rales. She exhibits no tenderness. Abdominal: Soft. Bowel sounds are normal. She exhibits no distension and no mass. There is no tenderness. There is no rebound and no guarding. Neurological: She is alert and oriented to person, place, and time. She displays normal reflexes. No cranial nerve deficit. She exhibits normal muscle tone. Coordination normal.   Skin: Skin is warm. No rash noted. She is not diaphoretic. No pallor. Vitals reviewed. Medical Decision Making   I am the first provider for this patient. I reviewed the vital signs, available nursing notes, past medical history, past surgical history, family history and social history. Old Medical Records: Old medical records. Previous electrocardiograms. Nursing notes. Provider Notes:     DDx: Coronary syndrome, PE, aortic dissection, referred GI pain, PNA, reflux    Impression/plan: Pt presents with atypical chest discomfort and subjective SOB x several weeks that appear to worsen today. Has no significant cardiac risk factors.  Will obtain labs, EKG, trop, and CT. Has had a normal stress test. Will follow up to cardiology if everything is otherwise normal.       ED Course:  1:57 PM  Initial assessment performed. The patients presenting problems have been discussed, and they are in agreement with the care plan formulated and outlined with them. I have encouraged them to ask questions as they arise throughout their visit. Progress Notes:     4:13 PM  Pt has no complaints. Discussed with her the plan of getting CT report and second troponin. If normal will follow up with cardiology and PCP. Initiate baby aspirin. Written by Regino Vargas ED scribe, as dictated by Arslan Roberts MD    SIGN OUT:  4:20 PM  Patient's presentation, labs/imaging and plan of care was reviewed with Bernarda Giraldo DO as part of sign out. They will check pt's secondary troponin and CT as part of the plan discussed with the patient. Bernarda Giraldo DO's assistance in completion of this plan is greatly appreciated but it should be noted that I will be the provider of record for this patient.     Arslan Roberts MD    Diagnostic Study Results     Labs -      Recent Results (from the past 12 hour(s))   EKG, 12 LEAD, INITIAL    Collection Time: 11/18/17  1:36 PM   Result Value Ref Range    Ventricular Rate 92 BPM    Atrial Rate 92 BPM    P-R Interval 136 ms    QRS Duration 86 ms    Q-T Interval 386 ms    QTC Calculation (Bezet) 477 ms    Calculated P Axis 72 degrees    Calculated R Axis 5 degrees    Calculated T Axis 56 degrees    Diagnosis       Normal sinus rhythm  Nonspecific ST abnormality  Abnormal ECG  When compared with ECG of 15-DEC-2014 11:18,  No significant change was found     CBC WITH AUTOMATED DIFF    Collection Time: 11/18/17  2:22 PM   Result Value Ref Range    WBC 5.3 3.6 - 11.0 K/uL    RBC 4.53 3.80 - 5.20 M/uL    HGB 13.3 11.5 - 16.0 g/dL    HCT 38.7 35.0 - 47.0 %    MCV 85.4 80.0 - 99.0 FL    MCH 29.4 26.0 - 34.0 PG    MCHC 34.4 30.0 - 36.5 g/dL    RDW 12.3 11.5 - 14.5 %    PLATELET 763 356 - 510 K/uL    NEUTROPHILS 52 32 - 75 %    LYMPHOCYTES 41 12 - 49 %    MONOCYTES 6 5 - 13 %    EOSINOPHILS 1 0 - 7 %    BASOPHILS 0 0 - 1 %    ABS. NEUTROPHILS 2.7 1.8 - 8.0 K/UL    ABS. LYMPHOCYTES 2.2 0.8 - 3.5 K/UL    ABS. MONOCYTES 0.3 0.0 - 1.0 K/UL    ABS. EOSINOPHILS 0.1 0.0 - 0.4 K/UL    ABS. BASOPHILS 0.0 0.0 - 0.1 K/UL   D DIMER    Collection Time: 11/18/17  2:23 PM   Result Value Ref Range    D-dimer <0.17 0.00 - 0.65 mg/L FEU   LIPASE    Collection Time: 11/18/17  2:23 PM   Result Value Ref Range    Lipase 232 73 - 393 U/L   MAGNESIUM    Collection Time: 11/18/17  2:23 PM   Result Value Ref Range    Magnesium 1.8 1.6 - 2.4 mg/dL   CK W/ REFLX CKMB    Collection Time: 11/18/17  2:23 PM   Result Value Ref Range     26 - 628 U/L   METABOLIC PANEL, COMPREHENSIVE    Collection Time: 11/18/17  2:23 PM   Result Value Ref Range    Sodium 142 136 - 145 mmol/L    Potassium 3.6 3.5 - 5.1 mmol/L    Chloride 108 97 - 108 mmol/L    CO2 28 21 - 32 mmol/L    Anion gap 6 5 - 15 mmol/L    Glucose 90 65 - 100 mg/dL    BUN 17 6 - 20 MG/DL    Creatinine 0.83 0.55 - 1.02 MG/DL    BUN/Creatinine ratio 20 12 - 20      GFR est AA >60 >60 ml/min/1.73m2    GFR est non-AA >60 >60 ml/min/1.73m2    Calcium 8.5 8.5 - 10.1 MG/DL    Bilirubin, total 0.5 0.2 - 1.0 MG/DL    ALT (SGPT) 23 12 - 78 U/L    AST (SGOT) 17 15 - 37 U/L    Alk.  phosphatase 54 45 - 117 U/L    Protein, total 6.9 6.4 - 8.2 g/dL    Albumin 3.7 3.5 - 5.0 g/dL    Globulin 3.2 2.0 - 4.0 g/dL    A-G Ratio 1.2 1.1 - 2.2     TROPONIN I    Collection Time: 11/18/17  2:23 PM   Result Value Ref Range    Troponin-I, Qt. <0.04 <0.05 ng/mL   URINALYSIS W/ REFLEX CULTURE    Collection Time: 11/18/17  3:02 PM   Result Value Ref Range    Color YELLOW/STRAW      Appearance CLEAR CLEAR      Specific gravity 1.020 1.003 - 1.030      pH (UA) 6.0 5.0 - 8.0      Protein NEGATIVE  NEG mg/dL    Glucose NEGATIVE  NEG mg/dL    Ketone NEGATIVE  NEG mg/dL    Bilirubin NEGATIVE  NEG      Blood TRACE (A) NEG      Urobilinogen 0.2 0.2 - 1.0 EU/dL    Nitrites NEGATIVE  NEG      Leukocyte Esterase NEGATIVE  NEG      WBC 0-4 0 - 4 /hpf    RBC 0-5 0 - 5 /hpf    Epithelial cells FEW FEW /lpf    Bacteria NEGATIVE  NEG /hpf    UA:UC IF INDICATED CULTURE NOT INDICATED BY UA RESULT CNI      Hyaline cast 0-2 0 - 5 /lpf   TROPONIN I    Collection Time: 11/18/17  4:22 PM   Result Value Ref Range    Troponin-I, Qt. <0.04 <0.05 ng/mL       Radiologic Studies -  The following have been ordered and reviewed:    CT Results  (Last 48 hours)               11/18/17 1551  CTA CHEST W OR W WO CONT Final result    Impression:  IMPRESSION: Normal CT arteriogram of the chest. No pulmonary embolus. Narrative:  EXAM: CT ANGIOGRAPHY CHEST    INDICATION: Shortness of breath and upper abdominal.   COMPARISON: .   CONTRAST: mL of Isovue-370. TECHNIQUE:    Precontrast  images were obtained to localize the volume for acquisition. Multislice helical CT arteriography was performed from the diaphragm to the   thoracic inlet during uneventful rapid bolus intravenous contrast   administration. Lung and soft tissue windows were generated. Coronal and   sagittal  reformatted images were also generated and 3D post processing   consisting of coronal maximum intensity projection images was performed. CT dose   reduction was achieved through use of a standardized protocol tailored for this   examination and automatic exposure control for dose modulation. FINDINGS:   LOWER NECK: The visualized portions of the thyroid and structures of the lower   neck are within normal limits. LUNGS: The lungs are clear of mass, nodule, airspace disease or edema. Is mild   hypoaeration in the posterior lower lobes. PLEURA: There is no pleural effusion or pneumothorax. PULMONARY ARTERIES: The pulmonary arteries are well enhanced and no pulmonary   emboli are identified. AORTA: The aorta enhances normally without evidence of aneurysm or dissection. MEDIASTINUM: There is no mediastinal or hilar adenopathy or mass. BONES AND SOFT TISSUES: The bones and soft tissues of the chest wall are within   normal limits. UPPER ABDOMEN: The visualized portions of the upper abdominal organs are normal.               CXR Results  (Last 48 hours)               11/18/17 1454  XR CHEST PORT Final result    Impression:  IMPRESSION:       No acute process. Stable exam.           Narrative:  EXAM:  XR CHEST PORT       INDICATION:  And chest pain for 2 weeks. Shortness of breath. COMPARISON: 8/3/2012       TECHNIQUE: Portable AP upright chest view at 1440 hours       FINDINGS: Cardiac monitoring wires overlie the thorax. The cardiomediastinal   contours are stable. The pulmonary vasculature is within normal limits. The lungs and pleural spaces are clear. There is no pneumothorax. The bones and   upper abdomen are stable. Vital Signs-Reviewed the patient's vital signs. Patient Vitals for the past 12 hrs:   Temp Pulse Resp BP SpO2   11/18/17 1812 - 94 22 103/55 98 %   11/18/17 1500 - 79 18 113/65 96 %   11/18/17 1401 - 84 15 134/84 98 %   11/18/17 1342 98.3 °F (36.8 °C) 99 18 122/75 100 %       Medications Given in the ED:  Medications   sodium chloride (NS) flush 5-10 mL (not administered)   sodium chloride (NS) flush 5-10 mL (not administered)   0.9% sodium chloride infusion (0 mL/hr IntraVENous IV Completed 11/18/17 1827)   iopamidol (ISOVUE-370) 76 % injection 100 mL (100 mL IntraVENous Given 11/18/17 1549)   sodium chloride (NS) flush 10 mL (10 mL IntraVENous Given 11/18/17 1551)       Pulse Oximetry Analysis - Normal 100% on RA     Cardiac Monitor:   Rate: 92  Rhythm: Normal Sinus Rhythm     EKG interpretation: (Preliminary)  1336  Rhythm: normal sinus rhythm; and regular .  Rate (approx.): 92; Axis: normal; SC interval: normal; QRS interval: normal ; ST/T wave: nonspecific ST abnormality  Written by Spencer Enriquez, ED Scribe, as dictated by Maggie Garcia MD    Diagnosis   Clinical Impression:   1. Chest pain, unspecified type         Plan:  1: Discharge home  Follow-up Information     Follow up With Details Comments [id number] Jose Miguel Rodney MD Call in 2 days  [address] [facility]  [address] [facility]      Philip Buckley MD Schedule an appointment as soon as possible for a visit in 2 days  [address]  [address]  [address] [phone]      [facility] EMERGENCY DEPT  If symptoms worsen [address] [place] [address]  [phone]        2:   Discharge Medication List as of 11/18/2017  4:14 PM      START taking these medications    Details   aspirin 81 mg chewable tablet Take 2 Tabs by mouth daily. , Print, Disp-100 Tab, R-0         CONTINUE these medications which have NOT CHANGED    Details   nicotinic acid (NIACIN) 100 mg tablet Take 400 mg by mouth every other day., Historical Med      rizatriptan (MAXALT-MLT) 10 mg disintegrating tablet Take one tablet at onset of headache, repeat once at 2 hours, max of 2 in 24 hours. , Normal, Disp-12 Tab, R-3      !! ARMOUR THYROID 30 mg tablet take 1 tablet once daily, NormalIn place of levothyroxine. Disp-30 Tab, R-0      ascorbic acid (VITAMIN C) 500 mg tablet Take  by mouth., Historical Med      magnesium 250 mg Tab Take  by mouth., Historical Med      diazePAM (VALIUM) 5 mg tablet Take one tablet at bedtime as needed for insomnia or back pain. , Phone In, Disp-30 Tab, R-1      !! thyroid, Pork, (ARMOUR THYROID) 15 mg tablet Take 1 Tab by mouth daily. Take one tablet twice a week, in addition to the 30mg tablet, Normal, Disp-8 Tab, R-1      OTHER,NON-FORMULARY, Historical Med       !! - Potential duplicate medications found. Please discuss with provider.         Return to ED if Worse    Disposition Note:    DISCHARGE NOTE:  5:48 PM  The patient is ready for discharge. The patients signs, symptoms, diagnosis, and instructions for discharge have been discussed and the pt has conveyed their understanding. The patient is to follow up as recommended with PCP or return to the ER should their symptoms worsen. Plan has been discussed and patient has conveyed their agreement.    _______________________________   Attestations: This note is prepared by Magdy Allen, acting as Scribe for Lissette Singh MD.    Lissette Singh MD: The scribe's documentation has been prepared under my direction and personally reviewed by me in its entirety.  I confirm that the note above accurately reflects all work, treatment, procedures, and medical decision making performed by me.      _______________________________

## 2017-11-18 NOTE — ED NOTES
MD Liza Tiwari reviewed discharge instructions with the patient. The patient verbalized understanding. Patient discharged home with vitals at baseline. Patient ambulatory to vehicle with slow and steady gait. No further complaints noted by patient at this time.

## 2017-11-18 NOTE — DISCHARGE INSTRUCTIONS
Chest Pain: Care Instructions  Your Care Instructions    There are many things that can cause chest pain. Some are not serious and will get better on their own in a few days. But some kinds of chest pain need more testing and treatment. Your doctor may have recommended a follow-up visit in the next 8 to 12 hours. If you are not getting better, you may need more tests or treatment. Even though your doctor has released you, you still need to watch for any problems. The doctor carefully checked you, but sometimes problems can develop later. If you have new symptoms or if your symptoms do not get better, get medical care right away. If you have worse or different chest pain or pressure that lasts more than 5 minutes or you passed out (lost consciousness), call 911 or seek other emergency help right away. A medical visit is only one step in your treatment. Even if you feel better, you still need to do what your doctor recommends, such as going to all suggested follow-up appointments and taking medicines exactly as directed. This will help you recover and help prevent future problems. How can you care for yourself at home? · Rest until you feel better. · Take your medicine exactly as prescribed. Call your doctor if you think you are having a problem with your medicine. · Do not drive after taking a prescription pain medicine. When should you call for help? Call 911 if:  ? · You passed out (lost consciousness). ? · You have severe difficulty breathing. ? · You have symptoms of a heart attack. These may include:  ¨ Chest pain or pressure, or a strange feeling in your chest.  ¨ Sweating. ¨ Shortness of breath. ¨ Nausea or vomiting. ¨ Pain, pressure, or a strange feeling in your back, neck, jaw, or upper belly or in one or both shoulders or arms. ¨ Lightheadedness or sudden weakness. ¨ A fast or irregular heartbeat.   After you call 911, the  may tell you to chew 1 adult-strength or 2 to 4 low-dose aspirin. Wait for an ambulance. Do not try to drive yourself. ?Call your doctor today if:  ? · You have any trouble breathing. ? · Your chest pain gets worse. ? · You are dizzy or lightheaded, or you feel like you may faint. ? · You are not getting better as expected. ? · You are having new or different chest pain. Where can you learn more? Go to http://ashley-cornelia.info/. Enter A120 in the search box to learn more about \"Chest Pain: Care Instructions. \"  Current as of: 2017  Content Version: 11.4  © 7112-0349 [employer]. Care instructions adapted under license by [employer] (which disclaims liability or warranty for this information). If you have questions about a medical condition or this instruction, always ask your healthcare professional. [address] any warranty or liability for your use of this information. [employer] Activation    Thank you for requesting access to [employer]. Please follow the instructions below to securely access and download your online medical record. [employer] allows you to send messages to your doctor, view your test results, renew your prescriptions, schedule appointments, and more. How Do I Sign Up? 1. In your internet browser, go to [email or web address] [email or web address]  2. Click on the First Time User? Click Here link in the Sign In box. You will be redirect to the New Member Sign Up page. 3. Enter your [employer] Access Code exactly as it appears below. You will not need to use this code after youve completed the sign-up process. If you do not sign up before the expiration date, you must request a new code. [employer] Access Code: Activation code not generated  Current [employer] Status: Active (This is the date your [employer] access code will )    4. Enter the last four digits of your Social Security Number (xxxx) and Date of Birth (mm/dd/yyyy) as indicated and click Submit.  You will be taken to the next sign-up page. 5. Create a [employer] ID. This will be your [employer] login ID and cannot be changed, so think of one that is secure and easy to remember. 6. Create a [employer] password. You can change your password at any time. 7. Enter your Password Reset Question and Answer. This can be used at a later time if you forget your password. 8. Enter your e-mail address. You will receive e-mail notification when new information is available in [address]. 9. Click Sign Up. You can now view and download portions of your medical record. 10. Click the Download Summary menu link to download a portable copy of your medical information. Additional Information    If you have questions, please visit the Frequently Asked Questions section of the [employer] website at [email or web address] Remember, [employer] is NOT to be used for urgent needs. For medical emergencies, dial 911.

## 2017-11-18 NOTE — ED NOTES
Patient reports to ED with complaints of chest pain that began approximately 2 weeks ago. Patient reports the pain gets worse when she sleeps on her sides, however, she has not found anything to make the pain better. She has taken tums and gas x for the pain, however, she has obtained no relief. Patient denies dizziness, sob, and weakness. Patient placed on the monitor x3, call bell is within reach. Side rails x2. No further complaints noted by patient at this time. 1500 Patient resting comfortably in stretcher on her phone. NO further complaints noted at this time. 1600 Patient tearful upon this RN entering room. Patient concerned about flushed feeling she obtained from CT. This RN addressed concerns and is currently resting in stretcher. Call bell is within reach. 1700 Patient resting in stretcher. Patient provided with cup of water. No further complaints noted at this time.

## 2017-11-19 LAB
ATRIAL RATE: 92 BPM
CALCULATED P AXIS, ECG09: 72 DEGREES
CALCULATED R AXIS, ECG10: 5 DEGREES
CALCULATED T AXIS, ECG11: 56 DEGREES
DIAGNOSIS, 93000: NORMAL
P-R INTERVAL, ECG05: 136 MS
Q-T INTERVAL, ECG07: 386 MS
QRS DURATION, ECG06: 86 MS
QTC CALCULATION (BEZET), ECG08: 477 MS
VENTRICULAR RATE, ECG03: 92 BPM

## 2017-11-20 ENCOUNTER — PATIENT OUTREACH (OUTPATIENT)
Dept: OTHER | Age: 57
End: 2017-11-20

## 2017-11-20 NOTE — PROGRESS NOTES
Transition Of Care Note    Patient discharged from [facility] ED on  for Chest Pain. Medical History:     Past Medical History:   Diagnosis Date    Arrhythmia     PAC'S AND PVCS    Arthritis 's    neck arthritis    Chronic pain 's    Neck pain    Migraine, cervicogenic 's    Neurological disorder     migraines       Care Manager contacted the patient by telephone to perform post ED discharge assessment. Verified  and zip code with patient as identifiers. Provided introduction to self, and explanation of the Nurse Care Manager role. Medication:   Discussed newly prescribed medication with patient and patient verbalizes understanding of administration of home medications. There were no barriers to obtaining medications identified at this time. Support system:  patient    Discharge Instructions :  Reviewed discharge instructions with patient. Patient verbalizes understanding of discharge instructions and follow-up care. Red Flags:  Call your doctor today if:  ·You have any trouble breathing. ·Your chest pain gets worse. ·You are dizzy or lightheaded, or you feel like you may faint. ·You are not getting better as expected. ·You are having new or different chest pain. Advance Care Planning:   Patient was offered the opportunity to discuss advance care planning:  no     Does patient have an Advance Directive:  yes   If no, did you provide information on Caring Connections?  no     PCP/Specialist follow up: Patient scheduled to follow up with Abbey Farooq MD as needed. Patient will call and schedule a follow up appointment with Cardiologist Dr. Lorin Vargas. This CM offered to be of assistance if needed. Reviewed red flags with patient, and patient verbalizes understanding. Patient given an opportunity to ask questions. No other clinical/social/functional needs noted. The patient agrees to contact the PCP office for questions related to their healthcare.   The patient expressed thanks, offered no additional questions and ended the call.

## 2017-12-05 ENCOUNTER — PATIENT OUTREACH (OUTPATIENT)
Dept: OTHER | Age: 57
End: 2017-12-05

## 2017-12-05 NOTE — PROGRESS NOTES
Telephonic outreach to patient to follow up after visit to the ED with chest pain. Patient verified  and zip code. Patient stated that she continues to have daily chest pain. Reviewed red flags with patient. She stated that she has followed up with her cardiologist, Dr. Beltre  and has a stress test scheduled in January. Patient was also referred back to Dr. Marlene Mon with [facility] to have an Endoscopy completed. This CM assisted the patient with providing Dr. Johny Donaldson number and location and stated that she would be calling to schedule the appointment. Patient also informed this CM about an uncomfortable situation that she had in the ED with one of the nurses there. Patient was referred to patient advocacy. This CM will reach out to patient later this month to assess progress.

## 2017-12-19 DIAGNOSIS — G43.909 MIGRAINE WITHOUT STATUS MIGRAINOSUS, NOT INTRACTABLE, UNSPECIFIED MIGRAINE TYPE: ICD-10-CM

## 2017-12-19 RX ORDER — RIZATRIPTAN BENZOATE 10 MG/1
TABLET, ORALLY DISINTEGRATING ORAL
Qty: 36 TAB | Refills: 0 | Status: SHIPPED | OUTPATIENT
Start: 2017-12-19 | End: 2018-03-07 | Stop reason: SDUPTHER

## 2018-01-02 DIAGNOSIS — M54.5 LOW BACK PAIN, UNSPECIFIED BACK PAIN LATERALITY, UNSPECIFIED CHRONICITY, WITH SCIATICA PRESENCE UNSPECIFIED: ICD-10-CM

## 2018-01-02 DIAGNOSIS — F51.01 PRIMARY INSOMNIA: ICD-10-CM

## 2018-01-02 RX ORDER — DIAZEPAM 5 MG/1
TABLET ORAL
Qty: 30 TAB | Refills: 1 | OUTPATIENT
Start: 2018-01-02 | End: 2018-01-31 | Stop reason: SDUPTHER

## 2018-01-02 NOTE — TELEPHONE ENCOUNTER
Requested Prescriptions     Pending Prescriptions Disp Refills    diazePAM (VALIUM) 5 mg tablet 30 Tab 1     Sig: Take one tablet at bedtime as needed for insomnia or back pain.      Last Refill:10/24/2017    Last Office Visit: 07/27/2017    Upcoming Appointment: none

## 2018-01-08 ENCOUNTER — PATIENT OUTREACH (OUTPATIENT)
Dept: OTHER | Age: 58
End: 2018-01-08

## 2018-01-08 NOTE — PROGRESS NOTES
Telephonic outreach attempt to offer additional care management to patient. Left discreet VM, will follow up next week.

## 2018-01-17 ENCOUNTER — PATIENT OUTREACH (OUTPATIENT)
Dept: OTHER | Age: 58
End: 2018-01-17

## 2018-01-17 NOTE — LETTER
1/17/2018 3:43 PM 
 
Ms. Ghazal Starks [id number] [address] [address] Dear Ms. Ghazal Starks,  
 
I have been trying to reach you for a follow up call for services with our Employee Care Management Program. Your wellbeing is very important to us. With continued partnership in the [facility] AND SURGERY [facility] program, we hope to work with you to optimize your health and increase your quality of life. I look forward to continuing to work with you. Please contact me at your convenience and we can schedule a time that works best for you. Sincerely, SAHARA Rao RN  Employee Care Manager [address] [place] [address], [address] [place] [address] Cell [phone] Fax [email or web address]

## 2018-01-18 RX ORDER — LEVOTHYROXINE AND LIOTHYRONINE 76; 18 UG/1; UG/1
120 TABLET ORAL DAILY
COMMUNITY
End: 2019-10-23

## 2018-01-18 NOTE — PERIOP NOTES
[facility]  Ambulatory Surgery Unit  Pre-operative Instructions for Endo Procedures    Procedure Date  1/22/18            Tentative Arrival Time 1000      1. On the day of your procedure, please report to the Ambulatory Surgery Unit Registration Desk and sign in at your designated time. The Ambulatory Surgery Unit is located in [facility] on the [facility] side of [facility] across from the [address] [place] [address]. Please have all of your health insurance cards and a photo ID. 2. You must have someone with you to drive you home, as you should not drive a car for 24 hours following anesthesia. Please make arrangements for a responsible adult friend or family member to stay with you for at least the first 24 hours after your procedure. 3. Do not have anything to eat or drink (including water, gum, mints, coffee, juice) after midnight   1/21/18. This may not apply to medications prescribed by your physician. (Please note below the special instructions with medications to take the morning of your procedure.)    4. If applicable, follow the clear liquid diet and bowel prep instructions provided by your physician's office. If you do not have this information, or have any questions, please contact your physician's office. 5. We recommend you do not drink any alcoholic beverages for 24 hours before and after your procedure. 6. Contact your surgeons office for instructions on the following medications: non-steroidal anti-inflammatory drugs (i.e. Advil, Aleve), vitamins, and supplements. (Some surgeons will want you to stop these medications prior to surgery and others may allow you to take them)   **If you are currently taking Plavix, Coumadin, Aspirin and/or other blood-thinning agents, contact your surgeon for instructions. ** Your surgeon will partner with the physician prescribing these medications to determine if it is safe to stop or if you need to continue taking.  Please do not stop taking these medications without instructions from your surgeon. 7. In an effort to help prevent surgical site infection, we ask that you shower with an anti-bacterial soap (i.e. Dial or Safeguard) on the morning of your procedure. Do not apply any lotions, powders, or deodorants after showering. 8. Wear comfortable clothes. Wear glasses instead of contacts. Do not bring any jewelry or money (other than copays or fees as instructed). Do not wear make-up, particularly mascara, the morning of your procedure. Wear your hair loose or down, no ponytails, buns, lavern pins or clips. All body piercings must be removed. 9. You should understand that if you do not follow these instructions your procedure may be cancelled. If your physical condition changes (i.e. fever, cold or flu) please contact your surgeon as soon as possible. 10. It is important that you be on time. If a situation occurs where you may be late, or if you have any questions or problems, please call [phone]. 11. Your procedure time may be subject to change. You will receive a phone call the day prior to confirm your arrival time. Special Instructions: Take all medications and inhalers, as prescribed, on the morning of surgery with a sip of water EXCEPT: none      I understand a pre-operative phone call will be made to verify my procedure time. In the event that I am not available, I give permission for a message to be left on my answering service and/or with another person?       Yes     (instructions given verbally during phone assessment- pt voiced understanding)     ___________________      ___________________      ___________________  (Signature of Patient)          (Witness)                   (Date and Time)

## 2018-01-19 ENCOUNTER — ANESTHESIA EVENT (OUTPATIENT)
Dept: SURGERY | Age: 58
End: 2018-01-19
Payer: COMMERCIAL

## 2018-01-22 ENCOUNTER — ANESTHESIA (OUTPATIENT)
Dept: SURGERY | Age: 58
End: 2018-01-22
Payer: COMMERCIAL

## 2018-01-22 ENCOUNTER — HOSPITAL ENCOUNTER (OUTPATIENT)
Age: 58
Setting detail: OUTPATIENT SURGERY
Discharge: HOME | End: 2018-01-22
Attending: INTERNAL MEDICINE | Admitting: INTERNAL MEDICINE
Payer: COMMERCIAL

## 2018-01-22 VITALS
RESPIRATION RATE: 20 BRPM | SYSTOLIC BLOOD PRESSURE: 98 MMHG | HEART RATE: 82 BPM | BODY MASS INDEX: 22.82 KG/M2 | DIASTOLIC BLOOD PRESSURE: 55 MMHG | HEIGHT: 65 IN | OXYGEN SATURATION: 99 % | WEIGHT: 137 LBS | TEMPERATURE: 98.3 F

## 2018-01-22 PROCEDURE — 77030020255 HC SOL INJ LR 1000ML BG: Performed by: INTERNAL MEDICINE

## 2018-01-22 PROCEDURE — 76210000046 HC AMBSU PH II REC FIRST 0.5 HR: Performed by: INTERNAL MEDICINE

## 2018-01-22 PROCEDURE — 77030019988 HC FCPS ENDOSC DISP BSC -B: Performed by: INTERNAL MEDICINE

## 2018-01-22 PROCEDURE — 76030000002 HC AMB SURG OR TIME FIRST 0.: Performed by: INTERNAL MEDICINE

## 2018-01-22 PROCEDURE — 74011250636 HC RX REV CODE- 250/636

## 2018-01-22 PROCEDURE — 74011000250 HC RX REV CODE- 250

## 2018-01-22 PROCEDURE — 77030021352 HC CBL LD SYS DISP COVD -B: Performed by: INTERNAL MEDICINE

## 2018-01-22 PROCEDURE — 88305 TISSUE EXAM BY PATHOLOGIST: CPT | Performed by: INTERNAL MEDICINE

## 2018-01-22 PROCEDURE — 74011250636 HC RX REV CODE- 250/636: Performed by: ANESTHESIOLOGY

## 2018-01-22 PROCEDURE — 76060000073 HC AMB SURG ANES FIRST 0.5 HR: Performed by: INTERNAL MEDICINE

## 2018-01-22 PROCEDURE — 76210000040 HC AMBSU PH I REC FIRST 0.5 HR: Performed by: INTERNAL MEDICINE

## 2018-01-22 RX ORDER — GLYCOPYRROLATE 0.2 MG/ML
INJECTION INTRAMUSCULAR; INTRAVENOUS AS NEEDED
Status: DISCONTINUED | OUTPATIENT
Start: 2018-01-22 | End: 2018-01-22 | Stop reason: HOSPADM

## 2018-01-22 RX ORDER — PROPOFOL 10 MG/ML
INJECTION, EMULSION INTRAVENOUS AS NEEDED
Status: DISCONTINUED | OUTPATIENT
Start: 2018-01-22 | End: 2018-01-22 | Stop reason: HOSPADM

## 2018-01-22 RX ORDER — DIPHENHYDRAMINE HYDROCHLORIDE 50 MG/ML
12.5 INJECTION, SOLUTION INTRAMUSCULAR; INTRAVENOUS AS NEEDED
Status: DISCONTINUED | OUTPATIENT
Start: 2018-01-22 | End: 2018-01-22 | Stop reason: HOSPADM

## 2018-01-22 RX ORDER — LIDOCAINE HYDROCHLORIDE 20 MG/ML
INJECTION, SOLUTION EPIDURAL; INFILTRATION; INTRACAUDAL; PERINEURAL AS NEEDED
Status: DISCONTINUED | OUTPATIENT
Start: 2018-01-22 | End: 2018-01-22 | Stop reason: HOSPADM

## 2018-01-22 RX ORDER — SODIUM CHLORIDE 0.9 % (FLUSH) 0.9 %
5-10 SYRINGE (ML) INJECTION EVERY 8 HOURS
Status: DISCONTINUED | OUTPATIENT
Start: 2018-01-22 | End: 2018-01-22 | Stop reason: HOSPADM

## 2018-01-22 RX ORDER — SODIUM CHLORIDE, SODIUM LACTATE, POTASSIUM CHLORIDE, CALCIUM CHLORIDE 600; 310; 30; 20 MG/100ML; MG/100ML; MG/100ML; MG/100ML
25 INJECTION, SOLUTION INTRAVENOUS CONTINUOUS
Status: DISCONTINUED | OUTPATIENT
Start: 2018-01-22 | End: 2018-01-22 | Stop reason: HOSPADM

## 2018-01-22 RX ORDER — SODIUM CHLORIDE 0.9 % (FLUSH) 0.9 %
5-10 SYRINGE (ML) INJECTION AS NEEDED
Status: DISCONTINUED | OUTPATIENT
Start: 2018-01-22 | End: 2018-01-22 | Stop reason: HOSPADM

## 2018-01-22 RX ORDER — FENTANYL CITRATE 50 UG/ML
25 INJECTION, SOLUTION INTRAMUSCULAR; INTRAVENOUS
Status: DISCONTINUED | OUTPATIENT
Start: 2018-01-22 | End: 2018-01-22 | Stop reason: HOSPADM

## 2018-01-22 RX ORDER — ONDANSETRON 2 MG/ML
4 INJECTION INTRAMUSCULAR; INTRAVENOUS AS NEEDED
Status: DISCONTINUED | OUTPATIENT
Start: 2018-01-22 | End: 2018-01-22 | Stop reason: HOSPADM

## 2018-01-22 RX ORDER — LIDOCAINE HYDROCHLORIDE 10 MG/ML
0.1 INJECTION, SOLUTION EPIDURAL; INFILTRATION; INTRACAUDAL; PERINEURAL AS NEEDED
Status: DISCONTINUED | OUTPATIENT
Start: 2018-01-22 | End: 2018-01-22 | Stop reason: HOSPADM

## 2018-01-22 RX ADMIN — PROPOFOL 100 MG: 10 INJECTION, EMULSION INTRAVENOUS at 11:06

## 2018-01-22 RX ADMIN — PROPOFOL 30 MG: 10 INJECTION, EMULSION INTRAVENOUS at 11:10

## 2018-01-22 RX ADMIN — LIDOCAINE HYDROCHLORIDE 100 MG: 20 INJECTION, SOLUTION EPIDURAL; INFILTRATION; INTRACAUDAL; PERINEURAL at 11:06

## 2018-01-22 RX ADMIN — SODIUM CHLORIDE, SODIUM LACTATE, POTASSIUM CHLORIDE, AND CALCIUM CHLORIDE 25 ML/HR: 600; 310; 30; 20 INJECTION, SOLUTION INTRAVENOUS at 10:21

## 2018-01-22 RX ADMIN — GLYCOPYRROLATE 0.2 MG: 0.2 INJECTION INTRAMUSCULAR; INTRAVENOUS at 11:05

## 2018-01-22 NOTE — DISCHARGE INSTRUCTIONS
[place] Office: [phone]    [facility]  [id number]  1960    EGD/COLONOSCOPY DISCHARGE INSTRUCTIONS  Discomfort:  Sore throat- throat lozenges or warm salt water gargle  redness at IV site- apply warm compress to area; if redness or soreness persist- contact your physician  Gaseous discomfort- walking, belching will help relieve any discomfort  You may not operate a vehicle for 24 hours  You may not engage in an occupation involving machinery or appliances for rest of today. You may not drink alcoholic beverages for at least 24 hours  Avoid making any critical decisions for at least 24 hour  DIET  You may resume your regular diet - however -  remember your colon is empty and a heavy meal will produce gas. Avoid these foods:  fried / greasy foods, excessive carbonated drinks or too much caffeine  MEDICATIONS   Regarding Aspirin or Nonsteroidal medications specifically, please see below. ACTIVITY  You may resume your normal daily activities. Spend the remainder of the day resting -  avoid any strenuous activity. CALL M.D. ANY SIGN OF   Increasing pain, nausea, vomiting  Abdominal distension (swelling)  New increased bleeding (oral or rectal)  Fever (chills)  Pain in chest area  Bloody discharge from nose or mouth  Shortness of breath    You may not take any Advil, Aspirin, Ibuprofen, Motrin, Aleve, or Goodys for 7 days, ONLY  Tylenol as needed for pain. Follow-up Instructions:   Call  Ortiz Dela Cruz MD for any questions or concerns  Results of procedure / biopsy in 7 days   Telephone # [phone]      Follow-up Information     None           DO NOT TAKE SLEEPING MEDICATIONS OR ANTIANXIETY MEDICATIONS WHILE TAKING NARCOTIC PAIN MEDICATIONS,  ESPECIALLY THE NIGHT OF ANESTHESIA. CPAP PATIENTS BE SURE TO WEAR MACHINE WHENEVER NAPPING OR SLEEPING.     DISCHARGE SUMMARY from Nurse    The following personal items collected during your admission are returned to you:   Dental Appliance: Dental Appliances: None  Vision: Visual Aid: None  Hearing Aid:    Jewelry:    Clothing:    Other Valuables:    Valuables sent to safe:        PATIENT INSTRUCTIONS:    After General Anesthesia or Intravenous Sedation, for 24 hours or while taking prescription Narcotics:        Someone should be with you for the next 24 hours. For your own safety, a responsible adult must drive you home. · Limit your activities  · Recommended activity: Rest today, up with assistance today. Do not climb stairs or shower unattended for the next 24 hours. · Please start with a soft bland diet and advance as tolerated (no nausea) to regular diet. · If you have a sore throat you should try the following: fluids, warm salt water gargles, or throat lozenges. If it does not improve after several days please follow up with your primary physician. · Do not drive and operate hazardous machinery  · Do not make important personal or business decisions  · Do  not drink alcoholic beverages  · If you have not urinated within 8 hours after discharge, please contact your surgeon on call. Report the following to your surgeon:  · Excessive pain, swelling, redness or odor of or around the surgical area  · Temperature over 100.5  · Nausea and vomiting lasting longer than 4 hours or if unable to take medications  · Any signs of decreased circulation or nerve impairment to extremity: change in color, persistent  numbness, tingling, coldness or increase pain      · You will receive a Post Operative Call from one of the Recovery Room Nurses on the day after your surgery to check on you. It is very important for us to know how you are recovering after your surgery. If you have an issue or need to speak with someone, please call your surgeon, do not wait for the post operative call. · You may receive an e-mail or letter in the mail from [employer] regarding your experience with us in the Ambulatory Surgery Unit.  Your feedback is valuable to us and we appreciate your participation in the survey. · If the above instructions are not adequate, please contact Cleopatra Eason RN, [facility] anesthesia Nurse Manager or our Anesthesiologist, at [phone]. If you are having problems after your surgery, call the physician at his office number. · We wish you a speedy recovery ? What to do at Home:      *  Please give a list of your current medications to your Primary Care Provider. *  Please update this list whenever your medications are discontinued, doses are      changed, or new medications (including over-the-counter products) are added. *  Please carry medication information at all times in case of emergency situations. These are general instructions for a healthy lifestyle:    No smoking/ No tobacco products/ Avoid exposure to second hand smoke    Surgeon General's Warning:  Quitting smoking now greatly reduces serious risk to your health. Obesity, smoking, and sedentary lifestyle greatly increases your risk for illness    A healthy diet, regular physical exercise & weight monitoring are important for maintaining a healthy lifestyle    You may be retaining fluid if you have a history of heart failure or if you experience any of the following symptoms:  Weight gain of 3 pounds or more overnight or 5 pounds in a week, increased swelling in our hands or feet or shortness of breath while lying flat in bed. Please call your doctor as soon as you notice any of these symptoms; do not wait until your next office visit. Recognize signs and symptoms of STROKE:    B - Balance  E - Eyes    F-  Face looks uneven    A-  Arms unable to move or move even    S-  Speech slurred or non-existent    T-  Time-call 911 as soon as signs and symptoms begin-DO NOT go       Back to bed or wait to see if you get better-TIME IS BRAIN.       If you have not received your influenza and/or pneumococcal vaccine, please follow up with your primary care physician. The discharge information has been reviewed with the patient and spouse. The patient and spouse verbalized understanding.

## 2018-01-22 NOTE — ANESTHESIA PREPROCEDURE EVALUATION
Anesthetic History   No history of anesthetic complications            Review of Systems / Medical History  Patient summary reviewed, nursing notes reviewed and pertinent labs reviewed    Pulmonary  Within defined limits                 Neuro/Psych         Headaches (migraines)     Cardiovascular            Dysrhythmias ( PACs) : PVC      Exercise tolerance: >4 METS     GI/Hepatic/Renal  Within defined limits              Endo/Other      Hypothyroidism  Arthritis (cervical)     Other Findings   Comments: Chronic neck pain           Physical Exam    Airway  Mallampati: II  TM Distance: 4 - 6 cm  Neck ROM: decreased range of motion   Mouth opening: Normal     Cardiovascular    Rhythm: regular  Rate: normal      Pertinent negatives: No murmur   Dental  No notable dental hx       Pulmonary  Breath sounds clear to auscultation               Abdominal  GI exam deferred       Other Findings            Anesthetic Plan    ASA: 2  Anesthesia type: general and total IV anesthesia          Induction: Intravenous  Anesthetic plan and risks discussed with: Patient

## 2018-01-22 NOTE — IP AVS SNAPSHOT
[address] [facility] 
[phone] Patient: Olinda Chavez MRN: [id number] XPE:[id number] About your hospitalization You were admitted on:  January 22, 2018 You last received care in the:  [facility] PACU You were discharged on:  January 22, 2018 Why you were hospitalized Your primary diagnosis was:  Not on File Follow-up Information None Discharge Orders None A check ana indicates which time of day the medication should be taken. My Medications CHANGE how you take these medications Instructions Each Dose to Equal  
 Morning Noon Evening Bedtime * ARMOUR THYROID 120 mg Tab Generic drug:  Thyroid (Pork) What changed:  Another medication with the same name was changed. Make sure you understand how and when to take each. Your last dose was: Your next dose is: Take 120 mg by mouth daily. Take with 15 mg for total of 135 mg daily 120 mg  
    
   
   
   
  
 * thyroid (Pork) 15 mg tablet Commonly known as:  ARMOUR THYROID What changed:   
- how much to take 
- additional instructions Your last dose was: Your next dose is: Take 1 Tab by mouth daily. Take one tablet twice a week, in addition to the 30mg tablet 15 mg  
    
   
   
   
  
 * Notice: This list has 2 medication(s) that are the same as other medications prescribed for you. Read the directions carefully, and ask your doctor or other care provider to review them with you. CONTINUE taking these medications Instructions Each Dose to Equal  
 Morning Noon Evening Bedtime  
 aspirin 81 mg chewable tablet Your last dose was: Your next dose is: Take 2 Tabs by mouth daily. 162 mg  
    
   
   
   
  
 diazePAM 5 mg tablet Commonly known as:  VALIUM Your last dose was: Your next dose is: Take one tablet at bedtime as needed for insomnia or back pain.  
     
   
   
   
  
 magnesium 250 mg Tab Your last dose was: Your next dose is: Take  by mouth daily. nicotinic acid 100 mg tablet Commonly known as:  NIACIN Your last dose was: Your next dose is: Take 400 mg by mouth every other day. 400 mg  
    
   
   
   
  
 OTHER(NON-FORMULARY) Your last dose was: Your next dose is:    
   
   
      
   
   
   
  
 rizatriptan 10 mg disintegrating tablet Commonly known as:  MAXALT-MLT Your last dose was: Your next dose is: LET 1 TABLET DISSOLVE IN MOUTH AT ONSET OF HEADACHE. MAY REPEAT ONCE IN 2 HRS. MAX=2 TABS/24 HRS  
     
   
   
   
  
 VITAMIN C 500 mg tablet Generic drug:  ascorbic acid (vitamin C) Your last dose was: Your next dose is: Take  by mouth daily. Discharge Instructions [place] Office: [phone] Lois Kim 
[id number] 
1960 EGD/COLONOSCOPY DISCHARGE INSTRUCTIONS Discomfort: 
Sore throat- throat lozenges or warm salt water gargle 
redness at IV site- apply warm compress to area; if redness or soreness persist- contact your physician Gaseous discomfort- walking, belching will help relieve any discomfort You may not operate a vehicle for 24 hours You may not engage in an occupation involving machinery or appliances for rest of today. You may not drink alcoholic beverages for at least 24 hours Avoid making any critical decisions for at least 24 hour DIET You may resume your regular diet  however -  remember your colon is empty and a heavy meal will produce gas. Avoid these foods:  fried / greasy foods, excessive carbonated drinks or too much caffeine MEDICATIONS Regarding Aspirin or Nonsteroidal medications specifically, please see below. ACTIVITY You may resume your normal daily activities. Spend the remainder of the day resting -  avoid any strenuous activity. CALL M.D. ANY SIGN OF Increasing pain, nausea, vomiting Abdominal distension (swelling) New increased bleeding (oral or rectal) Fever (chills) Pain in chest area Bloody discharge from nose or mouth Shortness of breath You may not take any Advil, Aspirin, Ibuprofen, Motrin, Aleve, or Goodys for 7 days, ONLY  Tylenol as needed for pain. Follow-up Instructions: 
 Call  Mubashir A. Marylee Lisle, MD for any questions or concerns Results of procedure / biopsy in 7 days Telephone # [phone] Follow-up Information None DO NOT TAKE TYLENOL/ACETAMINOPHEN WITH PERCOCET, [facility], [address] TAKE NARCOTIC PAIN MEDICATIONS WITH FOOD If given 2 pain narcotics do NOT take together! Narcotics tend to be constipating, we suggest taking a stool softener such as Colace or Miralax (follow package instructions). DO NOT DRIVE WHILE TAKING NARCOTIC PAIN MEDICATIONS. DO NOT TAKE SLEEPING MEDICATIONS OR ANTIANXIETY MEDICATIONS WHILE TAKING NARCOTIC PAIN MEDICATIONS,  ESPECIALLY THE NIGHT OF ANESTHESIA. CPAP PATIENTS BE SURE TO WEAR MACHINE WHENEVER NAPPING OR SLEEPING. DISCHARGE SUMMARY from Nurse The following personal items collected during your admission are returned to you:  
Dental Appliance: Dental Appliances: None Vision: Visual Aid: None Hearing Aid:   
Jewelry:   
Clothing:   
Other Valuables:   
Valuables sent to safe:   
 
 
PATIENT INSTRUCTIONS: 
 
After General Anesthesia or Intravenous Sedation, for 24 hours or while taking prescription Narcotics: 
      Someone should be with you for the next 24 hours. For your own safety, a responsible adult must drive you home. · Limit your activities · Recommended activity: Rest today, up with assistance today. Do not climb stairs or shower unattended for the next 24 hours. · Please start with a soft bland diet and advance as tolerated (no nausea) to regular diet. · If you have a sore throat you should try the following: fluids, warm salt water gargles, or throat lozenges. If it does not improve after several days please follow up with your primary physician. · Do not drive and operate hazardous machinery · Do not make important personal or business decisions · Do  not drink alcoholic beverages · If you have not urinated within 8 hours after discharge, please contact your surgeon on call. Report the following to your surgeon: 
· Excessive pain, swelling, redness or odor of or around the surgical area · Temperature over 100.5 · Nausea and vomiting lasting longer than 4 hours or if unable to take medications · Any signs of decreased circulation or nerve impairment to extremity: change in color, persistent  numbness, tingling, coldness or increase pain · You will receive a Post Operative Call from one of the Recovery Room Nurses on the day after your surgery to check on you. It is very important for us to know how you are recovering after your surgery. If you have an issue or need to speak with someone, please call your surgeon, do not wait for the post operative call. · You may receive an e-mail or letter in the mail from [facility] regarding your experience with us in the Ambulatory Surgery Unit. Your feedback is valuable to us and we appreciate your participation in the survey. · If the above instructions are not adequate, please contact Charmayne Evangelist, RN, [facility] anesthesia Nurse Manager or our Anesthesiologist, at [phone]. If you are having problems after your surgery, call the physician at his office number. · We wish you a speedy recovery ? What to do at Home: *  Please give a list of your current medications to your Primary Care Provider.  
 
*  Please update this list whenever your medications are discontinued, doses are 
 changed, or new medications (including over-the-counter products) are added. *  Please carry medication information at all times in case of emergency situations. These are general instructions for a healthy lifestyle: No smoking/ No tobacco products/ Avoid exposure to second hand smoke Surgeon General's Warning:  Quitting smoking now greatly reduces serious risk to your health. Obesity, smoking, and sedentary lifestyle greatly increases your risk for illness A healthy diet, regular physical exercise & weight monitoring are important for maintaining a healthy lifestyle You may be retaining fluid if you have a history of heart failure or if you experience any of the following symptoms:  Weight gain of 3 pounds or more overnight or 5 pounds in a week, increased swelling in our hands or feet or shortness of breath while lying flat in bed. Please call your doctor as soon as you notice any of these symptoms; do not wait until your next office visit. Recognize signs and symptoms of STROKE: 
 
B - Balance E - Eyes F-  Face looks uneven A-  Arms unable to move or move even S-  Speech slurred or non-existent T-  Time-call 911 as soon as signs and symptoms begin-DO NOT go Back to bed or wait to see if you get better-TIME IS BRAIN. If you have not received your influenza and/or pneumococcal vaccine, please follow up with your primary care physician. The discharge information has been reviewed with the {PATIENT PARENT GUARDIAN:[id number]}. The {PATIENT PARENT GUARDIAN:26233} verbalized understanding. Introducing [facility]! Dear Velia Vo: 
Thank you for requesting a [facility] account. Our records indicate that you already have an active [facility] account. You can access your account anytime at [email or web address] Did you know that you can access your hospital and ER discharge instructions at any time in [employer]? You can also review all of your test results from your hospital stay or ER visit. Additional Information If you have questions, please visit the Frequently Asked Questions section of the [facility] website at [email or web address] Remember, [employer]ks[employer] is NOT to be used for urgent needs. For medical emergencies, dial 911. Now available from your iPhone and Android! Providers Seen During Your Hospitalization Provider Specialty Primary office phone Vy Laughlin MD Gastroenterology [phone] Your Primary Care Physician (PCP) Primary Care Physician Office Phone Office Fax Parvin Harrington [phone] You are allergic to the following Allergen Reactions Adhesive Hives Folic Acid Other (comments) Chest pain  
    
 Sulfa (Sulfonamide Antibiotics) Nausea and Vomiting Sulfites Other (comments) Migraine Recent Documentation Height Weight Breastfeeding? BMI OB Status Smoking Status 1.651 m 62.1 kg No 22.8 kg/m2 Hysterectomy Former Smoker Emergency Contacts Name Discharge Info Relation Home Work Mobile [facility] CAREGIVER [3] Spouse [3] [phone] Patient Belongings The following personal items are in your possession at time of discharge: 
  Dental Appliances: None  Visual Aid: None Please provide this summary of care documentation to your next provider. Signatures-by signing, you are acknowledging that this After Visit Summary has been reviewed with you and you have received a copy. Patient Signature:  ____________________________________________________________ Date:  ____________________________________________________________  
  
Phyliss Ghee Provider Signature:  ____________________________________________________________ Date:  ____________________________________________________________

## 2018-01-22 NOTE — PERIOP NOTES
Pacu~  Pt discharged home in stable condition via w/c to car. Pt has all of her belongings (just clothing/coat) back on. Gait steady with min assist. Instructed to get up with assistance today.

## 2018-01-22 NOTE — PROCEDURES
[place] Office: [phone]      Esophagogastroduodenoscopy Procedure Note      Shahla Chang  1960  [id number]    Indication:  Abdominal pain, epigastric     : Keyla Saavedra MD    Referring Provider:  Justice Garcia MD    Sedation:  MAC anesthesia Propofol    Procedure Details:  After detailed informed consent was obtained for the procedure, with all risks and benefits of procedure explained the patient was taken to the endoscopy suite and placed in the left lateral decubitus position. Following sequential administration of sedation as per above, the endoscope was inserted into the mouth and advanced under direct vision to second portion of the duodenum. A careful inspection was made as the gastroscope was withdrawn, including a retroflexed view of the proximal stomach; findings and interventions are described below. Findings:     Esophagus: The esophageal mucosa in the proximal, mid and distal esophagus is normal.   Mid/distal esophageal biopsies taken to rule out eosinophilic esophagitis. The squamo-columnar junction is at 38 cm where the Z-line was noted. There is a 2 cm sliding hiatal hernia. Z line is irregular- Biopsies taken to rule out Zarate's esophagus. Stomach: The gastric mucosa has mild erythema in the body: biopsies are obtained . The fundus was found to be normal with no lesions noted on retroflexion. The angularis is normal as well. Duodenum:   The bulb and post bulbar mucosa is normal in appearance. The duodenal folds are normal. Biopsies to rule out celiac disease. There is a small D2 non bleeding AVM. Therapies:  biopsy of esophagus  biopsy of stomach body  biopsy of duodenal distal bulb, second portion    Specimen:  Specimens were collected as described and send to the laboratory. Complications:   None were encountered during the procedure. EBL:  None. Recommendations:     -Await pathology. ,   -Follow symptoms. ,   -Follow up with primary care physician,   -Follow up with me. Ortiz Ca MD  1/22/2018  11:15 AM

## 2018-01-22 NOTE — PERIOP NOTES
Stevan Beltran  1960  [id number]    Situation:  Verbal report given from: DAVID Rodgers  Procedure: Procedure(s):  ESOPHAGOGASTRODUODENOSCOPY (EGD)  ESOPHAGOGASTRODUODENAL (EGD) BIOPSY    Background:    Preoperative diagnosis: EPIGASTRIC PAIN, FAMILY HISTORY OF MALIGNANT NEOPLASM OF GASTROINTESTINAL TRACT    Postoperative diagnosis: Duodenal AVM, Hiatal hernia    :  Dr. Jacqueline Zavala    Assistant(s): Circ-1: Lemuel Herndon RN  Circ-2: Heidi Kline, RN  Scrub Tech-1: Shara Bailey    Specimens:   ID Type Source Tests Collected by Time Destination   1 : duodenum biopsy Preservative Duodenum  Ortiz Betts MD 1/22/2018 1109 Pathology   2 : gastric biopsy Preservative Gastric  Sinan Hamilton MD 1/22/2018 1110 Pathology   3 : GE junction biopsy Preservative GE Junction  Sinan Hamilton MD 1/22/2018 1111 Pathology       Assessment:  Intra-procedure medications   Propofol 130 mg      Anesthesia gave intra-procedure sedation and medications, see anesthesia flow sheet     Intravenous fluids: LR@ KVO     Vital signs stable     Abdominal assessment: round and soft       Recommendation:    Permission to share finding with family or friend yes    All side rails up, bed in low position, wheels locked. Nurse at bedside.

## 2018-01-22 NOTE — ANESTHESIA POSTPROCEDURE EVALUATION
Post-Anesthesia Evaluation and Assessment    Patient: Ghazal Starks MRN: [id number]  SSN: [id number]    YOB: 1960  Age: 62 y.o. Sex: female       Cardiovascular Function/Vital Signs  Visit Vitals    BP 98/55    Pulse 82    Temp 36.8 °C (98.3 °F)    Resp 20    Ht 5' 5\" (1.651 m)    Wt 62.1 kg (137 lb)    SpO2 99%    Breastfeeding No    BMI 22.8 kg/m2       Patient is status post general anesthesia for Procedure(s):  ESOPHAGOGASTRODUODENOSCOPY (EGD)  ESOPHAGOGASTRODUODENAL (EGD) BIOPSY. Nausea/Vomiting: None    Postoperative hydration reviewed and adequate. Pain:  Pain Scale 1: Numeric (0 - 10) (01/22/18 1132)  Pain Intensity 1: 0 (01/22/18 1132)   Managed    Neurological Status:   Neuro (WDL): Within Defined Limits (01/22/18 1132)  Neuro  Neurologic State: Alert (01/22/18 1132)   At baseline    Mental Status and Level of Consciousness: Arousable    Pulmonary Status:   O2 Device: Room air (01/22/18 1132)   Adequate oxygenation and airway patent    Complications related to anesthesia: None    Post-anesthesia assessment completed.  No concerns    Signed By: Henrik Arriaga MD     January 22, 2018

## 2018-01-22 NOTE — H&P
Pre-endoscopy H and P    The patient was seen and examined in the room/pre-op holding area. The airway was assessed and documented. The problem list, past medical history, and medications were reviewed. Patient Active Problem List   Diagnosis Code    Leiomyoma D21.9    Menorrhagia with regular cycle N92.0    Hypercholesteremia E78.00    Osteopenia M85.80    Occipital neuralgia M54.81    Vitamin D deficiency E55.9    Cyclothymic disorder, manic ([facility] [address] F34.0, K36.30    Ruptured silicone breast implant T85.49XA    Left breast mass N63.20    Family history of hemochromatosis Z83.49    Acquired hypothyroidism E03.9    Polyp of colon K63.5     Social History     Social History    Marital status:      Spouse name: N/A    Number of children: N/A    Years of education: N/A     Occupational History    Not on file. Social History Main Topics    Smoking status: Former Smoker     Packs/day: 0.50     Years: 30.00    Smokeless tobacco: Never Used    Alcohol use No    Drug use: No    Sexual activity: Not on file     Other Topics Concern    Not on file     Social History Narrative     Past Medical History:   Diagnosis Date    Arrhythmia     PAC'S AND PVCS    Arthritis 1970's    neck arthritis    Chronic pain 1970's    Neck pain    Hypothyroid     Migraine, cervicogenic 1970's         Prior to Admission Medications   Prescriptions Last Dose Informant Patient Reported? Taking? OTHER,NON-FORMULARY, Unknown at Unknown time  Yes No   Thyroid, Pork, (ARMOUR THYROID) 120 mg tab 1/22/2018 at 0400  Yes Yes   Sig: Take 120 mg by mouth daily. Take with 15 mg for total of 135 mg daily   ascorbic acid (VITAMIN C) 500 mg tablet 1/15/2018 at Unknown time  Yes Yes   Sig: Take  by mouth daily. aspirin 81 mg chewable tablet Not Taking at Unknown time  No No   Sig: Take 2 Tabs by mouth daily.    diazePAM (VALIUM) 5 mg tablet 12/22/2017 at Unknown time  No Yes   Sig: Take one tablet at bedtime as needed for insomnia or back pain.   magnesium 250 mg Tab 1/15/2018 at Unknown time  Yes Yes   Sig: Take  by mouth daily. nicotinic acid (NIACIN) 100 mg tablet 12/22/2017 at Unknown time  Yes Yes   Sig: Take 400 mg by mouth every other day. rizatriptan (MAXALT-MLT) 10 mg disintegrating tablet 1/15/2018 at Unknown time  No Yes   Sig: LET 1 TABLET DISSOLVE IN MOUTH AT ONSET OF HEADACHE. MAY REPEAT ONCE IN 2 HRS. MAX=2 TABS/24 HRS   thyroid, Pork, (ARMOUR THYROID) 15 mg tablet 1/15/2018 at Unknown time  No Yes   Sig: Take 1 Tab by mouth daily. Take one tablet twice a week, in addition to the 30mg tablet   Patient taking differently: Take 120 mg by mouth daily. Take one tablet twice a week, in addition to the 30mg tablet      Facility-Administered Medications: None           The review of systems is:  Negative  for shortness of breath or chest pain      The heart, lungs, and mental status were satisfactory for the administration of deep sedation and for the procedure. I discussed with the patient the objectives, risks, consequences and alternatives to the procedure.       Dominick Smith MD  1/22/2018  10:28 AM

## 2018-01-24 NOTE — PROGRESS NOTES
Pt states her throat is sore and she has been eating soup and softer foods. Instructed her to continue with the liquids/soft bland diet/ and to use warm salt water gargles several times a day. Instructed her to call her Dr if soreness persists still on Friday.

## 2018-01-31 DIAGNOSIS — F51.01 PRIMARY INSOMNIA: ICD-10-CM

## 2018-01-31 DIAGNOSIS — M54.5 LOW BACK PAIN, UNSPECIFIED BACK PAIN LATERALITY, UNSPECIFIED CHRONICITY, WITH SCIATICA PRESENCE UNSPECIFIED: ICD-10-CM

## 2018-02-02 RX ORDER — DIAZEPAM 5 MG/1
TABLET ORAL
Qty: 30 TAB | Refills: 0 | OUTPATIENT
Start: 2018-02-02 | End: 2018-03-07 | Stop reason: SDUPTHER

## 2018-02-05 NOTE — TELEPHONE ENCOUNTER
Approved Medications  diazePAM (VALIUM) 5 mg tablet  TAKE 1 TABLET BY MOUTH AT BEDTIME AS NEEDED FOR INSOMNIA OR BACK PAIN       Disp: 30 Tab Refills: 0    Class: Phone In Start: 2/2/2018   For: Primary insomnia, Low back pain, unspecified back pain laterality, unspecified chronicity, with sciatica presence unspecified  Approved by: Sabino Wisdom MD  To pharmacy: Not to exceed 5 additional fills before 03/24/2018  [facility] per  request. [facility]

## 2018-03-07 DIAGNOSIS — M54.5 LOW BACK PAIN, UNSPECIFIED BACK PAIN LATERALITY, UNSPECIFIED CHRONICITY, WITH SCIATICA PRESENCE UNSPECIFIED: ICD-10-CM

## 2018-03-07 DIAGNOSIS — F51.01 PRIMARY INSOMNIA: ICD-10-CM

## 2018-03-07 DIAGNOSIS — G43.909 MIGRAINE WITHOUT STATUS MIGRAINOSUS, NOT INTRACTABLE, UNSPECIFIED MIGRAINE TYPE: ICD-10-CM

## 2018-03-08 RX ORDER — DIAZEPAM 5 MG/1
TABLET ORAL
Qty: 30 TAB | Refills: 0 | OUTPATIENT
Start: 2018-03-08 | End: 2018-04-13 | Stop reason: SDUPTHER

## 2018-03-08 RX ORDER — RIZATRIPTAN BENZOATE 10 MG/1
TABLET, ORALLY DISINTEGRATING ORAL
Qty: 36 TAB | Refills: 0 | Status: SHIPPED | OUTPATIENT
Start: 2018-03-08 | End: 2018-05-07 | Stop reason: SDUPTHER

## 2018-04-13 DIAGNOSIS — M54.5 LOW BACK PAIN, UNSPECIFIED BACK PAIN LATERALITY, UNSPECIFIED CHRONICITY, WITH SCIATICA PRESENCE UNSPECIFIED: ICD-10-CM

## 2018-04-13 DIAGNOSIS — F51.01 PRIMARY INSOMNIA: ICD-10-CM

## 2018-04-16 RX ORDER — DIAZEPAM 5 MG/1
TABLET ORAL
Qty: 30 TAB | Refills: 0 | OUTPATIENT
Start: 2018-04-16 | End: 2018-07-09 | Stop reason: SDUPTHER

## 2018-04-17 ENCOUNTER — TELEPHONE (OUTPATIENT)
Dept: INTERNAL MEDICINE CLINIC | Age: 58
End: 2018-04-17

## 2018-04-17 NOTE — TELEPHONE ENCOUNTER
Writer called [facility] on file and cancelled rx for diazepam 5 mg tabs. Spoke pharmacist, Gerardo. [address]. Spoke with pharmacist, [address] [place] [address]. VORB given per Dr. Vince Saavedra for diazepam 5 mg tabs # 30, 0 refills.

## 2018-04-17 NOTE — TELEPHONE ENCOUNTER
[address] states pt is waiting there to  script. Recent script was to have been sent to them and not [facility].  Padmini sent the request she states. Please call in as soon as possible. Thanks.

## 2018-05-07 DIAGNOSIS — G43.909 MIGRAINE WITHOUT STATUS MIGRAINOSUS, NOT INTRACTABLE, UNSPECIFIED MIGRAINE TYPE: ICD-10-CM

## 2018-05-07 RX ORDER — RIZATRIPTAN BENZOATE 10 MG/1
TABLET, ORALLY DISINTEGRATING ORAL
Qty: 36 TAB | Refills: 0 | Status: SHIPPED | OUTPATIENT
Start: 2018-05-07 | End: 2018-07-26 | Stop reason: SDUPTHER

## 2018-05-07 NOTE — TELEPHONE ENCOUNTER
From: Anthony Sheth  To: Elio Levi MD  Sent: 5/7/2018 10:26 AM EDT  Subject: Medication Renewal Request    Original authorizing provider: MD Anthony Rainey would like a refill of the following medications:  rizatriptan (MAXALT-MLT) 10 mg disintegrating tablet Elio Levi MD]    Preferred pharmacy: [address] [place] [address]    Comment:  May I please refill this medication now so that I can take it with me on vacation. I will be in [place] from May 14-29 and will run out of my current script on May 15.  My current pharmacy is [facility]. [address], [address] [facility] [address], [address] [place] [address]

## 2018-05-07 NOTE — TELEPHONE ENCOUNTER
PCP: Barbra Frederick MD    Last appt: 7/27/2017  No future appointments. Requested Prescriptions     Pending Prescriptions Disp Refills    rizatriptan (MAXALT-MLT) 10 mg disintegrating tablet 36 Tab 0     Sig: Let 1 tablet dissolve in mouth at onset of headache. May repeat once in 2 hours.  Max=2 tabs/24hrs

## 2018-07-09 DIAGNOSIS — F51.01 PRIMARY INSOMNIA: ICD-10-CM

## 2018-07-10 RX ORDER — DIAZEPAM 5 MG/1
TABLET ORAL
Qty: 30 TAB | Refills: 0 | OUTPATIENT
Start: 2018-07-10 | End: 2018-08-09 | Stop reason: SDUPTHER

## 2018-07-10 NOTE — TELEPHONE ENCOUNTER
From: Jarrod Abreu  To: Abbey Farooq MD  Sent: 7/9/2018 12:15 PM EDT  Subject: Medication Renewal Request    Original authorizing provider: MD Jarrod Guidry would like a refill of the following medications:  diazePAM (VALIUM) 5 mg tablet Abbey Farooq MD]    Preferred pharmacy: [address] [place] [address]    Comment:

## 2018-07-11 NOTE — TELEPHONE ENCOUNTER
Approved Medications  diazePAM (VALIUM) 5 mg tablet  Take 1 tablet by mouth at bedtime as needed for insomnia or back pain. Disp: 30 Tab Refills: 0    Class: Phone In Start: 7/10/2018   For: Primary insomnia  Approved by: June Xiao MD  To pharmacy: Not to exceed 5 additional fills before 03/24/2018    Phoned in Diazepam to [facility]  per  request. Pantera Saleem with [facility] who gave Armani Peto.

## 2018-07-21 ENCOUNTER — PATIENT MESSAGE (OUTPATIENT)
Dept: INTERNAL MEDICINE CLINIC | Age: 58
End: 2018-07-21

## 2018-07-24 RX ORDER — CIPROFLOXACIN 500 MG/1
500 TABLET ORAL 2 TIMES DAILY
Qty: 14 TAB | Refills: 0 | Status: SHIPPED | OUTPATIENT
Start: 2018-07-24 | End: 2018-10-17
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
## 2018-07-26 DIAGNOSIS — G43.909 MIGRAINE WITHOUT STATUS MIGRAINOSUS, NOT INTRACTABLE, UNSPECIFIED MIGRAINE TYPE: ICD-10-CM

## 2018-07-26 RX ORDER — RIZATRIPTAN BENZOATE 10 MG/1
TABLET, ORALLY DISINTEGRATING ORAL
Qty: 36 TAB | Refills: 0 | Status: SHIPPED | OUTPATIENT
Start: 2018-07-26 | End: 2018-08-13 | Stop reason: SDUPTHER

## 2018-07-26 RX ORDER — RIZATRIPTAN BENZOATE 10 MG/1
TABLET, ORALLY DISINTEGRATING ORAL
Qty: 36 TAB | Refills: 0 | Status: SHIPPED | OUTPATIENT
Start: 2018-07-26 | End: 2018-07-26 | Stop reason: SDUPTHER

## 2018-07-26 NOTE — TELEPHONE ENCOUNTER
From: Clinton Johnson  To: Olivier Newby MD  Sent: 7/26/2018 11:01 AM EDT  Subject: Medication Renewal Request    Original authorizing provider: MD Clinton Zaman would like a refill of the following medications:  rizatriptan (MAXALT-MLT) 10 mg disintegrating tablet Olivier Newby MD]    Preferred pharmacy: [address] [place] [address]    Comment:

## 2018-08-09 DIAGNOSIS — F51.01 PRIMARY INSOMNIA: ICD-10-CM

## 2018-08-10 NOTE — TELEPHONE ENCOUNTER
From: Tammy Hurst  To: Salina Miner MD  Sent: 8/9/2018 4:11 PM EDT  Subject: Medication Renewal Request    Original authorizing provider: MD Tammy Hernandez would like a refill of the following medications:  diazePAM (VALIUM) 5 mg tablet Salina Miner MD]    Preferred pharmacy: [address] [place] [address]    Comment:

## 2018-08-12 RX ORDER — DIAZEPAM 5 MG/1
TABLET ORAL
Qty: 30 TAB | Refills: 0 | OUTPATIENT
Start: 2018-08-12 | End: 2018-09-12 | Stop reason: SDUPTHER

## 2018-08-13 RX ORDER — RIZATRIPTAN BENZOATE 10 MG/1
TABLET, ORALLY DISINTEGRATING ORAL
Qty: 36 TAB | Refills: 0 | Status: SHIPPED | OUTPATIENT
Start: 2018-08-13 | End: 2018-09-11 | Stop reason: SDUPTHER

## 2018-08-13 NOTE — TELEPHONE ENCOUNTER
Approved Medications  diazePAM (VALIUM) 5 mg tablet  Take 1 tablet by mouth at bedtime as needed for insomnia or back pain. Disp: 30 Tab Refills: 0    Class: Phone In Start: 8/12/2018   For: Primary insomnia  Approved by: Bailey Gregory MD  To pharmacy: Not to exceed 5 additional fills before 03/24/2018    Phoned in Diazepam to [facility]  per  request. Ninfa Grewal with [facility] who gave Tonya Upton.

## 2018-09-10 DIAGNOSIS — F51.01 PRIMARY INSOMNIA: ICD-10-CM

## 2018-09-10 RX ORDER — DIAZEPAM 5 MG/1
TABLET ORAL
Qty: 30 TAB | Refills: 0 | OUTPATIENT
Start: 2018-09-10

## 2018-09-10 NOTE — TELEPHONE ENCOUNTER
PCP: June Xiao MD    Last appt: 7/27/2017  No future appointments. Requested Prescriptions     Pending Prescriptions Disp Refills    diazePAM (VALIUM) 5 mg tablet 30 Tab 0     Sig: Take 1 tablet by mouth at bedtime as needed for insomnia or back pain.

## 2018-09-10 NOTE — TELEPHONE ENCOUNTER
From: Neil Dias  To: Arpit Cadet MD  Sent: 9/10/2018 9:49 AM EDT  Subject: Medication Renewal Request    Original authorizing provider: MD Neil Trammell would like a refill of the following medications:  diazePAM (VALIUM) 5 mg tablet Arpit Cadet MD]    Preferred pharmacy: [address] [place] [address]    Comment:

## 2018-09-11 DIAGNOSIS — G43.909 MIGRAINE WITHOUT STATUS MIGRAINOSUS, NOT INTRACTABLE, UNSPECIFIED MIGRAINE TYPE: ICD-10-CM

## 2018-09-11 NOTE — TELEPHONE ENCOUNTER
Refused Medications  diazePAM (VALIUM) 5 mg tablet  Take 1 tablet by mouth at bedtime as needed for insomnia or back pain. Disp: 30 Tab Refills: 0    Class: Normal Start: 9/10/2018   For: Primary insomnia  Refused by: Orion Abbott MD  Refusal reason: Appt required, please call patient  To pharmacy: Not to exceed 5 additional fills before 03/24/2018    Called, spoke to pt. Two pt identifiers confirmed. Pt offered and accepted appointment 10/17/2018 at 11:45    Pt verbalized understanding of information discussed w/ no further questions at this time.

## 2018-09-11 NOTE — TELEPHONE ENCOUNTER
Requested Prescriptions     Pending Prescriptions Disp Refills    rizatriptan (MAXALT-MLT) 10 mg disintegrating tablet 36 Tab 0     Sig: Let 1 tablet dissolve in mouth at onset of headache. May repeat once in 2 hours. PCP: Jamal Vann MD    Last appt: 7/27/2017  Future Appointments  Date Time Provider Bettina Brasher   10/17/2018 11:45 AM Jamal Vann MD [address]       Requested Prescriptions     Pending Prescriptions Disp Refills    rizatriptan (MAXALT-MLT) 10 mg disintegrating tablet 36 Tab 0     Sig: Let 1 tablet dissolve in mouth at onset of headache. May repeat once in 2 hours.

## 2018-09-12 DIAGNOSIS — F51.01 PRIMARY INSOMNIA: ICD-10-CM

## 2018-09-12 RX ORDER — RIZATRIPTAN BENZOATE 10 MG/1
TABLET, ORALLY DISINTEGRATING ORAL
Qty: 36 TAB | Refills: 0 | Status: SHIPPED | OUTPATIENT
Start: 2018-09-12 | End: 2018-12-05 | Stop reason: SDUPTHER

## 2018-09-12 RX ORDER — DIAZEPAM 5 MG/1
TABLET ORAL
Qty: 30 TAB | Refills: 0 | OUTPATIENT
Start: 2018-09-12 | End: 2018-10-10 | Stop reason: SDUPTHER

## 2018-09-12 NOTE — TELEPHONE ENCOUNTER
From: Justyn José  To: Tania Vides MD  Sent: 9/12/2018 2:51 PM EDT  Subject: Medication Renewal Request    Original authorizing provider: MD Justyn Jordan would like a refill of the following medications:  diazePAM (VALIUM) 5 mg tablet Tania Vides MD]    Preferred pharmacy: [address] [place] [address]    Comment:  Was denied pending my making an appt for a yearly exam but I have now made my yearly appt. First available was October.

## 2018-09-13 NOTE — TELEPHONE ENCOUNTER
Approved Medications  diazePAM (VALIUM) 5 mg tablet  Take 1 tablet by mouth at bedtime as needed for insomnia or back pain. Disp: 30 Tab Refills: 0    Class: Phone In Start: 9/12/2018   For: Primary insomnia  Approved by: Finley Bosworth, MD  To pharmacy: Not to exceed 5 additional fills before 03/24/2018    Phoned in Diazepam to [facility]  per  request. Katie Carroll with [facility] who gave Jesus Ricci.

## 2018-10-10 DIAGNOSIS — F51.01 PRIMARY INSOMNIA: ICD-10-CM

## 2018-10-10 RX ORDER — DIAZEPAM 5 MG/1
TABLET ORAL
Qty: 30 TAB | Refills: 0 | OUTPATIENT
Start: 2018-10-10 | End: 2018-12-05 | Stop reason: SDUPTHER

## 2018-10-10 NOTE — TELEPHONE ENCOUNTER
PCP: Dwaine Rosario MD    Last appt: 7/27/2017  Future Appointments  Date Time Provider Bettina Brasher   10/17/2018 11:45 AM Dwaine Rosario MD [facility]       Requested Prescriptions     Pending Prescriptions Disp Refills    diazePAM (VALIUM) 5 mg tablet 30 Tab 0     Sig: Take 1 tablet by mouth at bedtime as needed for insomnia or back pain.

## 2018-10-10 NOTE — TELEPHONE ENCOUNTER
From: David Wilcox  To: Dani Stevens MD  Sent: 10/10/2018 10:09 AM EDT  Subject: Medication Renewal Request    Original authorizing provider: MD David Campa would like a refill of the following medications:  diazePAM (VALIUM) 5 mg tablet Dnai Stevens MD]    Preferred pharmacy: [address] [place] [address]    Comment:

## 2018-10-17 ENCOUNTER — OFFICE VISIT (OUTPATIENT)
Dept: INTERNAL MEDICINE CLINIC | Age: 58
End: 2018-10-17

## 2018-10-17 VITALS
RESPIRATION RATE: 16 BRPM | OXYGEN SATURATION: 98 % | DIASTOLIC BLOOD PRESSURE: 79 MMHG | TEMPERATURE: 98.1 F | BODY MASS INDEX: 22.99 KG/M2 | SYSTOLIC BLOOD PRESSURE: 122 MMHG | WEIGHT: 138 LBS | HEART RATE: 80 BPM | HEIGHT: 65 IN

## 2018-10-17 DIAGNOSIS — E03.9 ACQUIRED HYPOTHYROIDISM: ICD-10-CM

## 2018-10-17 DIAGNOSIS — R13.10 DYSPHAGIA, UNSPECIFIED TYPE: ICD-10-CM

## 2018-10-17 DIAGNOSIS — R49.8 VOCAL TREMOR: Primary | ICD-10-CM

## 2018-10-17 NOTE — PROGRESS NOTES
1. Have you been to the ER, urgent care clinic since your last visit? Hospitalized since your last visit? No    2. Have you seen or consulted any other health care providers outside of the [address] [place] [address] since your last visit? Include any pap smears or colon screening.  No

## 2018-10-17 NOTE — PROGRESS NOTES
Deven Farrell is a 62 y.o. female Is here for a health maintenance exam.  July 2017. Had EGD in January 2018, Luna Blanco. Had duodenitis and gastritis. No NSAIDS or alcohol use. Was told to take prevacid, did not take it. She states that when she lowered her armour thyroid dose, many of her symptoms resolved. She states that she felt jittery. She states that the thyroid labs with Dr. Tyree Velásquez were ok. She was taking 90 mg tablet and every 3rd day takes an additional 15mg. She reduced dose for 2 weeks to 15mg once a day. She stopped the thyroid medication completely for 3 days. Is planning to stay off for another week and then return to 15mg dose. She also stopped vitamin D. She felt it was messing with her bio-identical hormones and thyroid medication. Sees Dr. Tyree Velásquez, ron. She has not consulted the endo about the dose change, follow up in March. Reports vocal tremor. Has seen Dr. Lionel Teran, neurology. Onset 3 years.       She has migraines, better with avoidance of sulfites. Taking maxalt.           Allergies   Allergen Reactions    Adhesive Hives    Folic Acid Other (comments)     Chest pain    Sulfa (Sulfonamide Antibiotics) Nausea and Vomiting    Sulfites Other (comments)     Migraine        Social History     Socioeconomic History    Marital status:      Spouse name: Not on file    Number of children: Not on file    Years of education: Not on file    Highest education level: Not on file   Social Needs    Financial resource strain: Not on file    Food insecurity - worry: Not on file    Food insecurity - inability: Not on file   [employer] needs - medical: Not on file   [employer] needs - non-medical: Not on file   Occupational History    Not on file   Tobacco Use    Smoking status: Former Smoker     Packs/day: 0.50     Years: 30.00     Pack years: 15.00    Smokeless tobacco: Never Used   Substance and Sexual Activity    Alcohol use: No    Drug use: No    Sexual activity: Not on file   Other Topics Concern    Not on file   Social History Narrative    Not on file        Past Medical History:   Diagnosis Date    Arrhythmia     PAC'S AND PVCS    Arthritis 1970's    neck arthritis    Chronic pain 1970's    Neck pain    Hypothyroid     Migraine, cervicogenic 1970's        Past Surgical History:   Procedure Laterality Date    HX BREAST AUGMENTATION  2000    saline implants    HX BREAST AUGMENTATION  2773    silicone implants    HX COLONOSCOPY      colon polyps    HX ENDOSCOPY      HX HEENT      CHIN REDUCTION    HX LATRICE AND BSO  2012       Family History   Problem Relation Age of Onset    Cancer Father         metastatic, unknown primary        Current Outpatient Medications   Medication Sig Dispense Refill    diazePAM (VALIUM) 5 mg tablet Take 1 tablet by mouth at bedtime as needed for insomnia or back pain. 30 Tab 0    rizatriptan (MAXALT-MLT) 10 mg disintegrating tablet Let 1 tablet dissolve in mouth at onset of headache. May repeat once in 2 hours. 36 Tab 0    Thyroid, Pork, (ARMOUR THYROID) 120 mg tab Take 120 mg by mouth daily. Take with 15 mg for total of 135 mg daily      aspirin 81 mg chewable tablet Take 2 Tabs by mouth daily. 100 Tab 0    nicotinic acid (NIACIN) 100 mg tablet Take 400 mg by mouth every other day.  thyroid, Pork, (ARMOUR THYROID) 15 mg tablet Take 1 Tab by mouth daily. Take one tablet twice a week, in addition to the 30mg tablet (Patient taking differently: Take 120 mg by mouth daily. Take one tablet twice a week, in addition to the 30mg tablet) 8 Tab 1    ascorbic acid (VITAMIN C) 500 mg tablet Take  by mouth daily.  magnesium 250 mg Tab Take  by mouth daily.       OTHER,NON-FORMULARY,             ROS:  General: negative for fevers, chills, anorexia, weight loss  Eyes:   negative for visual disturbance, irritation  ENT:   negative for tinnitus,sore throat,nasal congestion,ear pain, sinus pain  Resp: negative for cough, hemoptysis, dyspnea,wheezing  CV:   negative for chest pain, palpitations, lower extremity edema  GI:   negative for nausea, vomiting, diarrhea, abdominal pain,melena  Endo:               negative for polyuria,polydipsia,polyphagia,heat intolerance  :  negative for frequency, dysuria, hematuria, vaginal discharge  Skin:   negative for rash, pruritus  Heme:  negative for easy bruising, gum/nose bleeding  Musc:  negative for myalgias, arthralgias, back pain, muscle weakness, joint pain  Neuro:  negative for headaches, dizziness, numbness, focal weakness  Psych:  negative for feelings of anxiety, depression, mood changes      Blood pressure 122/79, pulse 80, temperature 98.1 °F (36.7 °C), temperature source Oral, resp. rate 16, height 5' 5\" (1.651 m), weight 138 lb (62.6 kg), last menstrual period 01/14/2013, SpO2 98 %. Body mass index is 22.96 kg/m². General: Well, no acute distress  HEENT:   PERRL,normal conjunctiva. Oropharynx: no erythema, no exudates, no lesions, normal tongue. NECK: No masses or LAD  RESP:  No wheezing, no rhonchi, no rales. No chest wall tenderness. CV:  RRR, normal S1S2, no murmur. NEURO: alert, nonfocal except fine hand tremor and vocal tremor      Assessment and Plan:        ICD-10-CM ICD-9-CM    1. Vocal tremor R49.8 333.1 REFERRAL TO ENT-OTOLARYNGOLOGY   2. Acquired hypothyroidism E03.9 244.9    3. Dysphagia, unspecified type R13.10 787.20 REFERRAL TO ENT-OTOLARYNGOLOGY   recommend patient discuss her thyroid medication with her endocrinologist and discouraged self titration of medication. Follow-up Disposition:  Return for follow up as needed.   Ty Edwards MD

## 2018-10-18 ENCOUNTER — TELEPHONE (OUTPATIENT)
Dept: INTERNAL MEDICINE CLINIC | Age: 58
End: 2018-10-18

## 2018-10-18 NOTE — TELEPHONE ENCOUNTER
----- Message from Melissa Castellanos MD sent at 10/17/2018 12:53 PM EDT -----  Please call Dr. Jahaira Allen, neurology with [facility].

## 2018-12-05 DIAGNOSIS — F51.01 PRIMARY INSOMNIA: ICD-10-CM

## 2018-12-05 DIAGNOSIS — G43.909 MIGRAINE WITHOUT STATUS MIGRAINOSUS, NOT INTRACTABLE, UNSPECIFIED MIGRAINE TYPE: ICD-10-CM

## 2018-12-06 RX ORDER — RIZATRIPTAN BENZOATE 10 MG/1
TABLET, ORALLY DISINTEGRATING ORAL
Qty: 36 TAB | Refills: 0 | Status: SHIPPED | OUTPATIENT
Start: 2018-12-06 | End: 2019-02-28 | Stop reason: SDUPTHER

## 2018-12-06 RX ORDER — DIAZEPAM 5 MG/1
TABLET ORAL
Qty: 30 TAB | Refills: 0 | OUTPATIENT
Start: 2018-12-06 | End: 2019-01-07 | Stop reason: SDUPTHER

## 2018-12-06 NOTE — TELEPHONE ENCOUNTER
PCP: Baljinder Martinez MD    Last appt: 10/17/2018  No future appointments. Requested Prescriptions     Pending Prescriptions Disp Refills    rizatriptan (MAXALT-MLT) 10 mg disintegrating tablet 36 Tab 0     Sig: Let 1 tablet dissolve in mouth at onset of headache. May repeat once in 2 hours.  diazePAM (VALIUM) 5 mg tablet 30 Tab 0     Sig: Take 1 tablet by mouth at bedtime as needed for insomnia or back pain.

## 2018-12-07 NOTE — TELEPHONE ENCOUNTER
diazePAM (VALIUM) 5 mg tablet         Sig: Take 1 tablet by mouth at bedtime as needed for insomnia or back pain. Disp:  30 Tab    Refills:  0    Start: 12/6/2018    Class: Phone In    Authorized by: Jewel Navarro MD    For: Primary insomnia    To pharmacy: Not to exceed 5 additional fills before 03/24/2018       Phoned in Diazepam to [facility]  per  request. Oracio Byers with [employer] who gave Sylvia [place].

## 2019-01-07 DIAGNOSIS — F51.01 PRIMARY INSOMNIA: ICD-10-CM

## 2019-01-08 RX ORDER — DIAZEPAM 5 MG/1
TABLET ORAL
Qty: 30 TAB | Refills: 0 | OUTPATIENT
Start: 2019-01-08 | End: 2019-02-02 | Stop reason: SDUPTHER

## 2019-01-08 NOTE — TELEPHONE ENCOUNTER
Approved Medications      diazePAM (VALIUM) 5 mg tablet         Sig: Take 1 tablet by mouth at bedtime as needed for insomnia or back pain. Disp:  30 Tab    Refills:  0    Start: 1/8/2019    Class: Phone In    Authorized by: Codi Curry MD    For: Primary insomnia    To pharmacy: Not to exceed 5 additional fills before 03/24/2018             Phoned in Diazepam to [facility]  per  request. Carlos Party with [facility] who gave Jody Milder.

## 2019-01-08 NOTE — TELEPHONE ENCOUNTER
PCP: Nisha Casas MD    Last appt: 10/17/2018  No future appointments. Requested Prescriptions     Pending Prescriptions Disp Refills    diazePAM (VALIUM) 5 mg tablet 30 Tab 0     Sig: Take 1 tablet by mouth at bedtime as needed for insomnia or back pain.

## 2019-02-02 DIAGNOSIS — F51.01 PRIMARY INSOMNIA: ICD-10-CM

## 2019-02-04 RX ORDER — DIAZEPAM 5 MG/1
TABLET ORAL
Qty: 30 TAB | Refills: 0 | OUTPATIENT
Start: 2019-02-04 | End: 2019-02-28 | Stop reason: SDUPTHER

## 2019-02-04 NOTE — TELEPHONE ENCOUNTER
Requested Prescriptions     Pending Prescriptions Disp Refills    diazePAM (VALIUM) 5 mg tablet 30 Tab 0     Sig: Take 1 tablet by mouth at bedtime as needed for insomnia or back pain. PCP: Stanislaw Gavin MD    Last appt: 10/17/2018  No future appointments. Requested Prescriptions     Pending Prescriptions Disp Refills    diazePAM (VALIUM) 5 mg tablet 30 Tab 0     Sig: Take 1 tablet by mouth at bedtime as needed for insomnia or back pain.

## 2019-02-04 NOTE — TELEPHONE ENCOUNTER
Approved Medications      diazePAM (VALIUM) 5 mg tablet         Sig: Take 1 tablet by mouth at bedtime as needed for insomnia or back pain. Disp:  30 Tab    Refills:  0    Start: 2/4/2019    Class: Phone In    Authorized by: Hari Noonan MD    For: Primary insomnia    To pharmacy: Not to exceed 5 additional fills before 03/24/2018        Phoned in Diazepam to [facility] per  request. Maida Romero with [facility] who gave Barnet Shells.

## 2019-02-28 DIAGNOSIS — G43.909 MIGRAINE WITHOUT STATUS MIGRAINOSUS, NOT INTRACTABLE, UNSPECIFIED MIGRAINE TYPE: ICD-10-CM

## 2019-02-28 DIAGNOSIS — F51.01 PRIMARY INSOMNIA: ICD-10-CM

## 2019-02-28 NOTE — TELEPHONE ENCOUNTER
PCP: Reji Fair MD    Last appt: 10/17/2018  No future appointments. Requested Prescriptions     Pending Prescriptions Disp Refills    rizatriptan (MAXALT-MLT) 10 mg disintegrating tablet 36 Tab 0     Sig: Let 1 tablet dissolve in mouth at onset of headache. May repeat once in 2 hours.  diazePAM (VALIUM) 5 mg tablet 30 Tab 0     Sig: Take 1 tablet by mouth at bedtime as needed for insomnia or back pain.

## 2019-03-01 RX ORDER — DIAZEPAM 5 MG/1
TABLET ORAL
Qty: 30 TAB | Refills: 2 | OUTPATIENT
Start: 2019-03-01 | End: 2019-05-30 | Stop reason: SDUPTHER

## 2019-03-01 RX ORDER — RIZATRIPTAN BENZOATE 10 MG/1
TABLET, ORALLY DISINTEGRATING ORAL
Qty: 36 TAB | Refills: 3 | Status: SHIPPED | OUTPATIENT
Start: 2019-03-01 | End: 2019-10-03 | Stop reason: SDUPTHER

## 2019-05-30 DIAGNOSIS — F51.01 PRIMARY INSOMNIA: ICD-10-CM

## 2019-05-30 RX ORDER — DIAZEPAM 5 MG/1
TABLET ORAL
Qty: 30 TAB | Refills: 2 | OUTPATIENT
Start: 2019-05-30 | End: 2019-09-18 | Stop reason: SDUPTHER

## 2019-05-31 NOTE — TELEPHONE ENCOUNTER
Approved Medications      diazePAM (VALIUM) 5 mg tablet         Sig: Take 1 tablet by mouth at bedtime as needed for insomnia or back pain.  Indications: muscle spasm    Disp:  30 Tab    Refills:  2    Start: 5/30/2019    Class: Phone In    Authorized by: Joellen Wilder MD    For: Primary insomnia          Phoned in [facility] to Iljosé luisradha  per  request. Ninfa Grewal with [facility] who gave Tonya Upton.

## 2019-08-05 ENCOUNTER — HOSPITAL ENCOUNTER (OUTPATIENT)
Dept: MAMMOGRAPHY | Age: 59
Discharge: HOME | End: 2019-08-05
Attending: FAMILY MEDICINE
Payer: COMMERCIAL

## 2019-08-05 DIAGNOSIS — Z12.39 BREAST SCREENING: ICD-10-CM

## 2019-08-05 PROCEDURE — 77067 SCR MAMMO BI INCL CAD: CPT

## 2019-09-18 DIAGNOSIS — F51.01 PRIMARY INSOMNIA: ICD-10-CM

## 2019-09-18 NOTE — TELEPHONE ENCOUNTER
Requested Prescriptions     Pending Prescriptions Disp Refills    diazePAM (VALIUM) 5 mg tablet 30 Tab 2     Sig: Take 1 tablet by mouth at bedtime as needed for insomnia or back pain. Indications: muscle spasm     PCP: Brandon Street MD    Last appt: 10/17/2018  No future appointments. Requested Prescriptions     Pending Prescriptions Disp Refills    diazePAM (VALIUM) 5 mg tablet 30 Tab 2     Sig: Take 1 tablet by mouth at bedtime as needed for insomnia or back pain.   Indications: muscle spasm

## 2019-09-19 RX ORDER — DIAZEPAM 5 MG/1
TABLET ORAL
Qty: 30 TAB | Refills: 0 | Status: SHIPPED | OUTPATIENT
Start: 2019-09-19 | End: 2019-10-03 | Stop reason: SDUPTHER

## 2019-09-19 NOTE — TELEPHONE ENCOUNTER
LVM for patient on home and cell numbers listed in the chart to return call to the office. Pt due for annual exam in October. Faxed in Diazepam to [facility] per  request.     Fax confirmation received.

## 2019-10-23 ENCOUNTER — OFFICE VISIT (OUTPATIENT)
Dept: INTERNAL MEDICINE CLINIC | Age: 59
End: 2019-10-23

## 2019-10-23 VITALS
BODY MASS INDEX: 24.49 KG/M2 | DIASTOLIC BLOOD PRESSURE: 77 MMHG | SYSTOLIC BLOOD PRESSURE: 112 MMHG | HEART RATE: 87 BPM | TEMPERATURE: 97.8 F | RESPIRATION RATE: 18 BRPM | OXYGEN SATURATION: 99 % | HEIGHT: 65 IN | WEIGHT: 147 LBS

## 2019-10-23 DIAGNOSIS — E03.9 ACQUIRED HYPOTHYROIDISM: ICD-10-CM

## 2019-10-23 DIAGNOSIS — M25.50 ARTHRALGIA, UNSPECIFIED JOINT: ICD-10-CM

## 2019-10-23 DIAGNOSIS — M85.80 OSTEOPENIA, UNSPECIFIED LOCATION: ICD-10-CM

## 2019-10-23 DIAGNOSIS — M51.36 DDD (DEGENERATIVE DISC DISEASE), LUMBAR: ICD-10-CM

## 2019-10-23 DIAGNOSIS — Z00.00 ROUTINE MEDICAL EXAM: Primary | ICD-10-CM
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
## 2019-10-23 DIAGNOSIS — F51.01 PRIMARY INSOMNIA: ICD-10-CM

## 2019-10-23 RX ORDER — DIAZEPAM 5 MG/1
TABLET ORAL
Qty: 30 TAB | Refills: 3 | Status: SHIPPED | OUTPATIENT
Start: 2019-10-23 | End: 2019-12-04 | Stop reason: SDUPTHER

## 2019-10-23 RX ORDER — LEVOTHYROXINE AND LIOTHYRONINE 38; 9 UG/1; UG/1
TABLET ORAL DAILY
COMMUNITY
End: 2021-01-27 | Stop reason: SDUPTHER

## 2019-10-23 NOTE — PATIENT INSTRUCTIONS
Office Policies Phone calls/patient messages: Please allow up to 24 hours for someone in the office to contact you about your call or message. Be mindful your provider may be out of the office or your message may require further review. We encourage you to use [employer] for your messages as this is a faster, more efficient way to communicate with our office Medication Refills: 
         
Prescription medications require 48-72 business hours to process. We encourage you to use [employer] for your refills. For controlled medications: Please allow 72 business hours to process. Certain medications may require you to  a written prescription at our office. NO narcotic/controlled medications will be prescribed after 4pm Monday through Friday or on weekends Form/Paperwork Completion: 
         
Please note a $25 fee may incur for all paperwork for completed by our providers. We ask that you allow 7-10 business days. Pre-payment is due prior to picking up/faxing the completed form. You may also download your forms to [employer] to have your doctor print off.

## 2019-10-23 NOTE — PROGRESS NOTES
Reviewed record in preparation for visit and have obtained necessary documentation. Identified pt with two pt identifiers(name and ). Chief Complaint   Patient presents with    Complete Physical       Health Maintenance Due   Topic Date Due    Hepatitis C Test  1960    DTaP/Tdap/Td  (1 - Tdap) 1981    Pap Test  1981    Shingles Vaccine (1 of 2) 2010    Stool testing for trace blood  2010    Flu Vaccine  2019       Ms. Janessa Gaffney has a reminder for a \"due or due soon\" health maintenance. I have asked that she discuss this further with her primary care provider for follow-up on this health maintenance. Coordination of Care Questionnaire:  :     1) Have you been to an emergency room, urgent care clinic since your last visit? no   Hospitalized since your last visit? no             2) Have you seen or consulted any other health care providers outside of [address] [place] [address] since your last visit? no  (Include any pap smears or colon screenings in this section.)    3) In the event something were to happen to you and you were unable to speak on your behalf, do you have an Advance Directive/ Living Will in place stating your wishes? NO    Do you have an Advance Directive on file? yes    4) Are you interested in receiving information on Advance Directives? NO    Patient is accompanied by self I have received verbal consent from Mariana Joy to discuss any/all medical information while they are present in the room.

## 2019-10-23 NOTE — PROGRESS NOTES
Brodie Rodriguez is a 61 y.o. female Is here for a health maintenance exam.   Last seen October 2018. Sees Dr. Brooke Feldman, endo. Taking armour thyroid 60mg daily. Patient reports she is taking her own bio- idential hormones over the counter. [facility]. Has lower back pain. xrays LS spine DDD,DJD. In PT. No medications taken for the pain. Using heat. Had EGD in January 2018, Gadiel Blanco. Had duodenitis and gastritis. No PPI or H2 blockers.      She is taking vitamin D intermittently. Reports vocal tremor. Has seen Dr. Reno Moran, neurology.      She has migraines, better with avoidance of sulfites. Taking maxalt prn. Taking valium 5mg at bedtime, not nightly. Helpful when used.      Last pap, s/p LATRICE/BSO.  2012. Mammogram August 5. Colonoscopy Oct 2017, Dr. Hector Rice, normal.  Regular BM. No family history of colon cancer. Up to date on dental.  Eye exam 2018, normal.      She reports that in 1987 she had multiple tick bites. She was given antibiotic for 5 days. She states that she was told she had lymes. Reports tick bites 2 years ago. Reports she has tested positive for multiple tick borne infections in the past.  She reports neuropathy in hands. Negative EMG/NCV. She has diffuse joint pain. Reports elbows and knees will feel swollen and get red and hot. Has not seen rheumatology. Negative aida, negative RF in 2012.         Allergies   Allergen Reactions    Adhesive Hives    Folic Acid Other (comments)     Chest pain    Sulfa (Sulfonamide Antibiotics) Nausea and Vomiting    Sulfites Other (comments)     Migraine        Social History     Socioeconomic History    Marital status:      Spouse name: Not on file    Number of children: Not on file    Years of education: Not on file    Highest education level: Not on file   Tobacco Use    Smoking status: Former Smoker     Packs/day: 0.50     Years: 30.00     Pack years: 15.00    Smokeless tobacco: Never Used Substance and Sexual Activity    Alcohol use: No    Drug use: No        Past Medical History:   Diagnosis Date    Arrhythmia     PAC'S AND PVCS    Arthritis 1970's    neck arthritis    Chronic pain 1970's    Neck pain    Hypothyroid     Migraine, cervicogenic 1970's        Past Surgical History:   Procedure Laterality Date    HX BREAST AUGMENTATION  2000    saline implants    HX BREAST AUGMENTATION  2765    silicone implants    HX BREAST AUGMENTATION Bilateral 12/18/2014    REMOVAL OF BILATERAL BREAST IMPLANTS, CAPSULECTOMY, LIPOSUCTION OF ABDOMEN, FLANK, THIGHS WITH FAT TRANSFER TO BREASTS performed by Rito Montaño MD at [address] [place] [address] HX BREAST REDUCTION      2014    HX COLONOSCOPY      colon polyps    HX ENDOSCOPY      HX HEENT      CHIN REDUCTION    HX LATRICE AND BSO  2012    IMPLANT BREAST [address]      removed 2014       Family History   Problem Relation Age of Onset    Cancer Father         metastatic, unknown primary        Current Outpatient Medications   Medication Sig Dispense Refill    thyroid, Pork, (ARMOUR THYROID) 60 mg tablet Take  by mouth daily.  diazePAM (VALIUM) 5 mg tablet Take 1 tablet by mouth at bedtime as needed for insomnia or back pain. Indications: muscle spasm 30 Tab 3    rizatriptan (MAXALT-MLT) 10 mg disintegrating tablet Let 1 tablet dissolve in mouth at onset of headache. May repeat once in 2 hours. 36 Tab 0    aspirin 81 mg chewable tablet Take 2 Tabs by mouth daily. 100 Tab 0    nicotinic acid (NIACIN) 100 mg tablet Take 400 mg by mouth every other day.  ascorbic acid (VITAMIN C) 500 mg tablet Take  by mouth daily.  magnesium 250 mg Tab Take  by mouth daily.  OTHER,NON-FORMULARY,         ROS:  As per HPI    Blood pressure 112/77, pulse 87, temperature 97.8 °F (36.6 °C), temperature source Oral, resp. rate 18, height 5' 5\" (1.651 m), weight 147 lb (66.7 kg), last menstrual period 01/14/2013, SpO2 99 %.           Body mass index is 24.46 kg/m². General: Well, no acute distress  HEENT:   PERRL,normal conjunctiva. External ear and canals normal, TMs normal.  Hearing normal to voice. Nose without edema or discharge, with normal septum. Lips, teeth, gums normal.  Oropharynx: no erythema, no exudates, no lesions, normal tongue. NECK:  Supple. Thyroid normal size, nontender, without nodules. No carotid bruit. No masses or LAD  RESP:  No wheezing, no rhonchi, no rales. No chest wall tenderness. CV:  RRR, normal S1S2, no murmur. GI: soft, nontender, without masses. No hepatosplenomegaly. Extrem:  +2 pulses, no edema, warm distally  NEURO: alert, nonfocal  PSYCH: . Affect is alert and attentive. Assessment and Plan:        ICD-10-CM ICD-9-CM    1. Routine medical exam Z00.00 V70.0    2. Acquired hypothyroidism E03.9 244.9    3. Osteopenia, unspecified location M85.80 733.90 DEXA BONE DENSITY STUDY AXIAL   4. DDD (degenerative disc disease), lumbar M51.36 722.52    5. Tick bite, sequela W57. XXXS 906.2 LYME AB TOTAL W/RFLX W BLOT   6. Arthralgia, unspecified joint M25.50 719.40 REFERRAL TO RHEUMATOLOGY   7. Primary insomnia F51.01 307.42 diazePAM (VALIUM) 5 mg tablet       Follow-up and Dispositions    · Return for FOLLOW UP PENDING RECORD REVIEW AND ANNUAL.   Carmen Wright MD

## 2019-10-24 LAB — B BURGDOR IGG+IGM SER-ACNC: <0.91 ISR (ref 0–0.9)

## 2019-11-25 ENCOUNTER — HOSPITAL ENCOUNTER (OUTPATIENT)
Dept: BONE DENSITY | Age: 59
Discharge: HOME | End: 2019-11-25
Attending: FAMILY MEDICINE
Payer: COMMERCIAL

## 2019-11-25 DIAGNOSIS — M85.80 OSTEOPENIA, UNSPECIFIED LOCATION: ICD-10-CM

## 2019-11-25 PROCEDURE — 77080 DXA BONE DENSITY AXIAL: CPT

## 2019-11-28 NOTE — PROGRESS NOTES
Bone density test shows osteopenia. Recommend weight-bearing exercise, calcium 1200 to 1500 mg daily most from diet, vitamin D 1000 to 2000 IU daily as maintenance dose and keep an eye on the vitamin D level. Recheck bone density in two years.  Please send test to Dr. Jolanta Vela

## 2019-12-04 DIAGNOSIS — F51.01 PRIMARY INSOMNIA: ICD-10-CM

## 2019-12-04 RX ORDER — DIAZEPAM 5 MG/1
TABLET ORAL
Qty: 30 TAB | Refills: 3 | Status: SHIPPED | OUTPATIENT
Start: 2019-12-04 | End: 2020-05-24 | Stop reason: SDUPTHER

## 2019-12-04 NOTE — TELEPHONE ENCOUNTER
PCP: Mathew Devi MD    Last appt: 10/23/2019  No future appointments. Requested Prescriptions     Pending Prescriptions Disp Refills    diazePAM (VALIUM) 5 mg tablet 30 Tab 3     Sig: Take 1 tablet by mouth at bedtime as needed for insomnia or back pain.   Indications: muscle spasm

## 2020-05-24 DIAGNOSIS — F51.01 PRIMARY INSOMNIA: ICD-10-CM

## 2020-05-24 DIAGNOSIS — G43.909 MIGRAINE WITHOUT STATUS MIGRAINOSUS, NOT INTRACTABLE, UNSPECIFIED MIGRAINE TYPE: ICD-10-CM

## 2020-05-26 RX ORDER — DIAZEPAM 5 MG/1
TABLET ORAL
Qty: 30 TAB | Refills: 3 | Status: SHIPPED | OUTPATIENT
Start: 2020-05-26 | End: 2020-12-10 | Stop reason: SDUPTHER

## 2020-05-26 RX ORDER — RIZATRIPTAN BENZOATE 10 MG/1
TABLET, ORALLY DISINTEGRATING ORAL
Qty: 36 TAB | Refills: 0 | Status: SHIPPED | OUTPATIENT
Start: 2020-05-26 | End: 2020-09-25 | Stop reason: SDUPTHER

## 2020-09-25 DIAGNOSIS — G43.909 MIGRAINE WITHOUT STATUS MIGRAINOSUS, NOT INTRACTABLE, UNSPECIFIED MIGRAINE TYPE: ICD-10-CM

## 2020-09-28 RX ORDER — RIZATRIPTAN BENZOATE 10 MG/1
TABLET, ORALLY DISINTEGRATING ORAL
Qty: 36 TAB | Refills: 0 | Status: SHIPPED | OUTPATIENT
Start: 2020-09-28 | End: 2020-12-10 | Stop reason: SDUPTHER

## 2020-12-08 DIAGNOSIS — F51.01 PRIMARY INSOMNIA: ICD-10-CM

## 2020-12-08 DIAGNOSIS — G43.909 MIGRAINE WITHOUT STATUS MIGRAINOSUS, NOT INTRACTABLE, UNSPECIFIED MIGRAINE TYPE: ICD-10-CM

## 2020-12-08 RX ORDER — RIZATRIPTAN BENZOATE 10 MG/1
TABLET, ORALLY DISINTEGRATING ORAL
Qty: 36 TAB | Refills: 0 | OUTPATIENT
Start: 2020-12-08

## 2020-12-08 RX ORDER — DIAZEPAM 5 MG/1
TABLET ORAL
Qty: 30 TAB | Refills: 3 | OUTPATIENT
Start: 2020-12-08

## 2020-12-08 NOTE — TELEPHONE ENCOUNTER
Last seen October 2019. Once appointment is scheduled I will refill medication. Ideally she schedules an in person physical sometime early next year.

## 2020-12-08 NOTE — TELEPHONE ENCOUNTER
Future Appointments:  No future appointments. Last Appointment With Me:  Visit date not found     Requested Prescriptions     Pending Prescriptions Disp Refills    diazePAM (VALIUM) 5 mg tablet 30 Tab 3     Sig: Take 1 tablet by mouth at bedtime as needed for insomnia or back pain. Indications: muscle spasm    rizatriptan (MAXALT-MLT) 10 mg disintegrating tablet 36 Tab 0     Sig: Let 1 tablet dissolve in mouth at onset of headache. May repeat once in 2 hours.

## 2020-12-10 DIAGNOSIS — F51.01 PRIMARY INSOMNIA: ICD-10-CM

## 2020-12-10 DIAGNOSIS — G43.909 MIGRAINE WITHOUT STATUS MIGRAINOSUS, NOT INTRACTABLE, UNSPECIFIED MIGRAINE TYPE: ICD-10-CM

## 2020-12-11 RX ORDER — DIAZEPAM 5 MG/1
TABLET ORAL
Qty: 30 TAB | Refills: 3 | Status: SHIPPED | OUTPATIENT
Start: 2020-12-11 | End: 2020-12-11 | Stop reason: SDUPTHER

## 2020-12-11 RX ORDER — DIAZEPAM 5 MG/1
TABLET ORAL
Qty: 30 TAB | Refills: 0 | Status: SHIPPED | OUTPATIENT
Start: 2020-12-11 | End: 2021-04-07 | Stop reason: SDUPTHER

## 2020-12-11 RX ORDER — RIZATRIPTAN BENZOATE 10 MG/1
TABLET, ORALLY DISINTEGRATING ORAL
Qty: 36 TAB | Refills: 0 | Status: SHIPPED | OUTPATIENT
Start: 2020-12-11 | End: 2020-12-11 | Stop reason: SDUPTHER

## 2020-12-11 RX ORDER — RIZATRIPTAN BENZOATE 10 MG/1
TABLET, ORALLY DISINTEGRATING ORAL
Qty: 36 TAB | Refills: 0 | Status: SHIPPED | OUTPATIENT
Start: 2020-12-11 | End: 2021-05-13 | Stop reason: SDUPTHER

## 2020-12-11 NOTE — TELEPHONE ENCOUNTER
Future Appointments:  Future Appointments   Date Time Provider Bettina Brasher   1/27/2021 10:30 AM Son Alvarez MD [facility] BS AMB        Last Appointment With Me:  Visit date not found     Requested Prescriptions     Pending Prescriptions Disp Refills    diazePAM (VALIUM) 5 mg tablet 30 Tab 3     Sig: Take 1 tablet by mouth at bedtime as needed for insomnia or back pain. Indications: muscle spasm    rizatriptan (MAXALT-MLT) 10 mg disintegrating tablet 36 Tab 0     Sig: Let 1 tablet dissolve in mouth at onset of headache. May repeat once in 2 hours.

## 2021-01-27 ENCOUNTER — VIRTUAL VISIT (OUTPATIENT)
Dept: INTERNAL MEDICINE CLINIC | Age: 61
End: 2021-01-27

## 2021-01-27 DIAGNOSIS — G43.909 MIGRAINE WITHOUT STATUS MIGRAINOSUS, NOT INTRACTABLE, UNSPECIFIED MIGRAINE TYPE: ICD-10-CM

## 2021-01-27 DIAGNOSIS — E55.9 VITAMIN D DEFICIENCY: ICD-10-CM

## 2021-01-27 DIAGNOSIS — E03.9 ACQUIRED HYPOTHYROIDISM: Primary | ICD-10-CM

## 2021-01-27 DIAGNOSIS — E78.00 HYPERCHOLESTEREMIA: ICD-10-CM

## 2021-01-27 PROCEDURE — 99442 PR PHYS/QHP TELEPHONE EVALUATION 11-20 MIN: CPT | Performed by: FAMILY MEDICINE

## 2021-01-27 RX ORDER — LEVOTHYROXINE AND LIOTHYRONINE 38; 9 UG/1; UG/1
60 TABLET ORAL DAILY
Qty: 30 TAB | Refills: 5 | Status: SHIPPED | OUTPATIENT
Start: 2021-01-27

## 2021-01-27 NOTE — PROGRESS NOTES
Sherwin Monique is a 61 y.o. female who presents for follow up. Last seen Oct 2019. Under a lot of stress. Patient reports knows someone who committed suicide. She reports no one in her home is working. She reports she is cyclothymic. Prior reaction to prozac, caused manic. Not interested in medications for mood. Supportive . Sees ron Andino. Overdue for follow up. Taking armour thyroid 60mg. She was taking 4 days on, one day off. Until ran out. Not refilled due to no insurance and not being able to afford medication. Weight stable. Feeling cold. Reports mental function is off.      [facility] Has neck and lower back pain. Xrays spine DDD/DJD. Prior PT. Some exercises. Reports right arm tingling. Is taking valium for insomnia.        This is an established visit conducted via telemedicine, by phone. The patient has been instructed that this meets HIPAA criteria and acknowledges and agrees to this method of visitation. Pursuant to the emergency declaration under the [facility], [facility] waiver authority and the [employer] and [employer] Act, this Virtual Visit was conducted, with patient's consent, to reduce the patient's risk of exposure to COVID-19 and provide continuity of care for an established patient. Services were provided by phone to substitute for in-person clinic visit.        Past Medical History:   Diagnosis Date    Arrhythmia     PAC'S AND PVCS    Arthritis 1970's    neck arthritis    Chronic pain 1970's    Neck pain    Hypothyroid     Migraine, cervicogenic 18's       Family History   Problem Relation Age of Onset    Cancer Father         metastatic, unknown primary       Social History     Socioeconomic History    Marital status:      Spouse name: Not on file    Number of children: Not on file    Years of education: Not on file    Highest education level: Not on file   Occupational History    Not on file   Social Needs    Financial resource strain: Not on file    Food insecurity     Worry: Not on file     Inability: Not on file    Transportation needs     Medical: Not on file     Non-medical: Not on file   Tobacco Use    Smoking status: Former Smoker     Packs/day: 0.50     Years: 30.00     Pack years: 15.00    Smokeless tobacco: Never Used   Substance and Sexual Activity    Alcohol use: No    Drug use: No    Sexual activity: Not on file   Lifestyle    Physical activity     Days per week: Not on file     Minutes per session: Not on file    Stress: Not on file   Relationships    Social connections     Talks on phone: Not on file     Gets together: Not on file     Attends [employer] service: Not on file     Active member of club or organization: Not on file     Attends meetings of clubs or organizations: Not on file     Relationship status: Not on file    Intimate partner violence     Fear of current or ex partner: Not on file     Emotionally abused: Not on file     Physically abused: Not on file     Forced sexual activity: Not on file   Other Topics Concern    Not on file   Social History Narrative    Not on file       Current Outpatient Medications on File Prior to Visit   Medication Sig Dispense Refill    diazePAM (VALIUM) 5 mg tablet Take 1 tablet by mouth at bedtime as needed for insomnia or back pain. Indications: muscle spasm 30 Tab 0    rizatriptan (MAXALT-MLT) 10 mg disintegrating tablet Let 1 tablet dissolve in mouth at onset of headache. May repeat once in 2 hours. 36 Tab 0    aspirin 81 mg chewable tablet Take 2 Tabs by mouth daily. 100 Tab 0    nicotinic acid (NIACIN) 100 mg tablet Take 400 mg by mouth every other day.  ascorbic acid (VITAMIN C) 500 mg tablet Take  by mouth daily.  magnesium 250 mg Tab Take  by mouth daily.  OTHER,NON-FORMULARY,        No current facility-administered medications on file prior to visit. Review of Systems  Pertinent items are noted in HPI. Objective:     Gen: healthy sounding female  HEENT: no audible congestion, patient does not see oral erythema and sees MMM  Neck: patient does not feel enlarged or tender LAD or masses  Resp: normal respiratory effort, no audible wheezing. CV: patient does not feel palpitations or heart irregularity  Abd: patient does not feel abdominal tenderness or mass, patient does not notice distension  Extrem: patient does not see swelling in ankles or joints. Neuro: Alert and oriented, able to answer questions without difficulty, patient reports able to move all extremities and walk normally        Assessment/Plan:       ICD-10-CM ICD-9-CM    1. Acquired hypothyroidism  E03.9 244.9 thyroid, Pork, ([place] Thyroid) 60 mg tablet   2. Vitamin D deficiency  E55.9 268.9    3. Hypercholesteremia  E78.00 272.0    4. Migraine without status migrainosus, not intractable, unspecified migraine type  G43.909 346.90      Resume thyroid medication. Defer labs at this time. This was a telemedicine visit by phone, duration 11 minutes.          Bhargav Guevara MD

## 2021-02-05 ENCOUNTER — PATIENT MESSAGE (OUTPATIENT)
Dept: INTERNAL MEDICINE CLINIC | Age: 61
End: 2021-02-05

## 2021-02-05 DIAGNOSIS — M54.2 NECK PAIN: Primary | ICD-10-CM

## 2021-02-05 RX ORDER — TRAMADOL HYDROCHLORIDE 50 MG/1
50 TABLET ORAL
Qty: 30 TAB | Refills: 0 | Status: SHIPPED | OUTPATIENT
Start: 2021-02-05 | End: 2021-03-10 | Stop reason: SDUPTHER

## 2021-02-05 NOTE — TELEPHONE ENCOUNTER
----- Message from Mita Vanessa LPN sent at 6/7/8873  2:24 PM EST ----- Regarding: FW: Prescription Question Contact: [phone] 
 
----- Message ----- From: Marko Kwon Sent: 2/5/2021   2:11 PM EST To: [address] [place] [address] Subject: Prescription Question Hi Rodrigue Tosin - I tried one of Krystian's Tramadol 50 mg tablets and it subdued the shoulder/neck pain for about 5 hours. Will you write a prescription for me? (BTW - I did get a traction device - it does work briefly, a few minutes, but it causes my jaw to ache for hours. I also tried using it with my inversion machine and hung a 2 lb weight from it while on the inversion machine but again, limited relief and extreme  jaw pain for hours. I am missing all my molars on left side and the pressure closes that side of my mouth very deeply and aggravates my trigeminal nerve.) So, I would like to try the tramadol, at least until I get health insurance again and can start talking to orthopedic surgeons. Thank you

## 2021-03-10 DIAGNOSIS — M54.2 NECK PAIN: ICD-10-CM

## 2021-03-11 RX ORDER — TRAMADOL HYDROCHLORIDE 50 MG/1
50 TABLET ORAL
Qty: 30 TAB | Refills: 0 | Status: SHIPPED | OUTPATIENT
Start: 2021-03-11 | End: 2021-04-07 | Stop reason: SDUPTHER

## 2021-03-11 NOTE — TELEPHONE ENCOUNTER
Future Appointments: No future appointments. Last Appointment With Me: 
1/27/2021 Requested Prescriptions Pending Prescriptions Disp Refills  traMADoL (ULTRAM) 50 mg tablet 30 Tab 0 Sig: Take 1 Tab by mouth every six (6) hours as needed for Pain for up to 30 days. Max Daily Amount: 200 mg.

## 2021-04-07 DIAGNOSIS — M54.2 NECK PAIN: ICD-10-CM

## 2021-04-07 DIAGNOSIS — F51.01 PRIMARY INSOMNIA: ICD-10-CM

## 2021-04-07 RX ORDER — TRAMADOL HYDROCHLORIDE 50 MG/1
50 TABLET ORAL
Qty: 30 TAB | Refills: 0 | Status: SHIPPED | OUTPATIENT
Start: 2021-04-07 | End: 2021-05-13 | Stop reason: SDUPTHER

## 2021-04-07 RX ORDER — DIAZEPAM 5 MG/1
TABLET ORAL
Qty: 30 TAB | Refills: 0 | Status: SHIPPED | OUTPATIENT
Start: 2021-04-07 | End: 2021-05-13 | Stop reason: SDUPTHER

## 2021-04-07 NOTE — TELEPHONE ENCOUNTER
Future Appointments:  No future appointments. Last Appointment With Me:  1/27/2021     Requested Prescriptions     Pending Prescriptions Disp Refills    diazePAM (VALIUM) 5 mg tablet 30 Tab 0     Sig: Take 1 tablet by mouth at bedtime as needed for insomnia or back pain. Indications: muscle spasm    traMADoL (ULTRAM) 50 mg tablet 30 Tab 0     Sig: Take 1 Tab by mouth every six (6) hours as needed for Pain for up to 30 days. Max Daily Amount: 200 mg.

## 2021-05-13 DIAGNOSIS — F51.01 PRIMARY INSOMNIA: ICD-10-CM

## 2021-05-13 DIAGNOSIS — G43.909 MIGRAINE WITHOUT STATUS MIGRAINOSUS, NOT INTRACTABLE, UNSPECIFIED MIGRAINE TYPE: ICD-10-CM

## 2021-05-13 DIAGNOSIS — M54.2 NECK PAIN: ICD-10-CM

## 2021-05-13 RX ORDER — TRAMADOL HYDROCHLORIDE 50 MG/1
50 TABLET ORAL
Qty: 30 TAB | Refills: 0 | Status: SHIPPED | OUTPATIENT
Start: 2021-05-13 | End: 2021-06-06 | Stop reason: SDUPTHER

## 2021-05-13 RX ORDER — RIZATRIPTAN BENZOATE 10 MG/1
TABLET, ORALLY DISINTEGRATING ORAL
Qty: 36 TAB | Refills: 0 | Status: SHIPPED | OUTPATIENT
Start: 2021-05-13 | End: 2021-07-07 | Stop reason: SDUPTHER

## 2021-05-13 RX ORDER — DIAZEPAM 5 MG/1
TABLET ORAL
Qty: 30 TAB | Refills: 0 | Status: SHIPPED | OUTPATIENT
Start: 2021-05-13 | End: 2021-06-06 | Stop reason: SDUPTHER

## 2021-05-13 NOTE — TELEPHONE ENCOUNTER
Future Appointments:  No future appointments. Last Appointment With Me:  1/27/2021     Requested Prescriptions     Pending Prescriptions Disp Refills    rizatriptan (MAXALT-MLT) 10 mg disintegrating tablet 36 Tab 0     Sig: Let 1 tablet dissolve in mouth at onset of headache. May repeat once in 2 hours.  diazePAM (VALIUM) 5 mg tablet 30 Tab 0     Sig: Take 1 tablet by mouth at bedtime as needed for insomnia or back pain. Indications: muscle spasm    traMADoL (ULTRAM) 50 mg tablet 30 Tab 0     Sig: Take 1 Tab by mouth every six (6) hours as needed for Pain for up to 30 days. Max Daily Amount: 200 mg.

## 2021-06-06 DIAGNOSIS — F51.01 PRIMARY INSOMNIA: ICD-10-CM

## 2021-06-06 DIAGNOSIS — M54.2 NECK PAIN: ICD-10-CM

## 2021-06-07 RX ORDER — TRAMADOL HYDROCHLORIDE 50 MG/1
50 TABLET ORAL
Qty: 30 TABLET | Refills: 0 | Status: SHIPPED | OUTPATIENT
Start: 2021-06-07 | End: 2021-07-07 | Stop reason: SDUPTHER

## 2021-06-07 RX ORDER — DIAZEPAM 5 MG/1
TABLET ORAL
Qty: 30 TABLET | Refills: 0 | Status: SHIPPED | OUTPATIENT
Start: 2021-06-07 | End: 2021-07-07 | Stop reason: SDUPTHER

## 2021-06-07 NOTE — TELEPHONE ENCOUNTER
Future Appointments:  No future appointments. Last Appointment With Me:  1/27/2021     Requested Prescriptions     Pending Prescriptions Disp Refills    diazePAM (VALIUM) 5 mg tablet 30 Tablet 0     Sig: Take 1 tablet by mouth at bedtime as needed for insomnia or back pain. Indications: muscle spasm    traMADoL (ULTRAM) 50 mg tablet 30 Tablet 0     Sig: Take 1 Tablet by mouth every six (6) hours as needed for Pain for up to 30 days. Max Daily Amount: 200 mg.

## 2021-07-07 DIAGNOSIS — F51.01 PRIMARY INSOMNIA: ICD-10-CM

## 2021-07-07 DIAGNOSIS — M54.2 NECK PAIN: ICD-10-CM

## 2021-07-07 DIAGNOSIS — G43.909 MIGRAINE WITHOUT STATUS MIGRAINOSUS, NOT INTRACTABLE, UNSPECIFIED MIGRAINE TYPE: ICD-10-CM

## 2021-07-07 RX ORDER — RIZATRIPTAN BENZOATE 10 MG/1
TABLET, ORALLY DISINTEGRATING ORAL
Qty: 36 TABLET | Refills: 0 | Status: SHIPPED | OUTPATIENT
Start: 2021-07-07 | End: 2021-10-10 | Stop reason: SDUPTHER

## 2021-07-07 RX ORDER — DIAZEPAM 5 MG/1
TABLET ORAL
Qty: 30 TABLET | Refills: 0 | Status: SHIPPED | OUTPATIENT
Start: 2021-07-07 | End: 2021-08-06 | Stop reason: SDUPTHER

## 2021-07-07 RX ORDER — TRAMADOL HYDROCHLORIDE 50 MG/1
50 TABLET ORAL
Qty: 30 TABLET | Refills: 0 | Status: SHIPPED | OUTPATIENT
Start: 2021-07-07 | End: 2021-08-06 | Stop reason: SDUPTHER

## 2021-07-07 NOTE — TELEPHONE ENCOUNTER
Future Appointments:  No future appointments. Last Appointment With Me:  1/27/2021     Requested Prescriptions     Pending Prescriptions Disp Refills    rizatriptan (MAXALT-MLT) 10 mg disintegrating tablet 36 Tablet 0     Sig: Let 1 tablet dissolve in mouth at onset of headache. May repeat once in 2 hours.  diazePAM (VALIUM) 5 mg tablet 30 Tablet 0     Sig: Take 1 tablet by mouth at bedtime as needed for insomnia or back pain. Indications: muscle spasm    traMADoL (ULTRAM) 50 mg tablet 30 Tablet 0     Sig: Take 1 Tablet by mouth every six (6) hours as needed for Pain for up to 30 days. Max Daily Amount: 200 mg.

## 2021-08-06 DIAGNOSIS — M54.2 NECK PAIN: ICD-10-CM

## 2021-08-06 DIAGNOSIS — F51.01 PRIMARY INSOMNIA: ICD-10-CM

## 2021-08-06 RX ORDER — DIAZEPAM 5 MG/1
TABLET ORAL
Qty: 30 TABLET | Refills: 0 | Status: SHIPPED | OUTPATIENT
Start: 2021-08-06 | End: 2021-09-08 | Stop reason: SDUPTHER

## 2021-08-06 RX ORDER — TRAMADOL HYDROCHLORIDE 50 MG/1
50 TABLET ORAL
Qty: 30 TABLET | Refills: 0 | Status: SHIPPED | OUTPATIENT
Start: 2021-08-06 | End: 2021-09-08 | Stop reason: SDUPTHER

## 2021-09-03 DIAGNOSIS — F51.01 PRIMARY INSOMNIA: ICD-10-CM

## 2021-09-03 DIAGNOSIS — M54.2 NECK PAIN: ICD-10-CM

## 2021-09-03 RX ORDER — TRAMADOL HYDROCHLORIDE 50 MG/1
50 TABLET ORAL
Qty: 30 TABLET | Refills: 0 | OUTPATIENT
Start: 2021-09-03 | End: 2021-10-03

## 2021-09-03 RX ORDER — DIAZEPAM 5 MG/1
TABLET ORAL
Qty: 30 TABLET | Refills: 0 | OUTPATIENT
Start: 2021-09-03

## 2021-09-03 NOTE — TELEPHONE ENCOUNTER
Patient has been receiving diazepam and tramadol routinely. She has not been seen since January. She is currently not under controlled substance program.  Please notify patient that she will require a follow-up prior to any refill on these medications.   We will need to discuss the controlled substance program if she requires medications monthly

## 2021-09-03 NOTE — TELEPHONE ENCOUNTER
Future Appointments:  No future appointments. Last Appointment With Me:  Visit date not found     Requested Prescriptions     Pending Prescriptions Disp Refills    diazePAM (VALIUM) 5 mg tablet 30 Tablet 0     Sig: Take 1 tablet by mouth at bedtime as needed for insomnia or back pain. Indications: muscle spasm    traMADoL (ULTRAM) 50 mg tablet 30 Tablet 0     Sig: Take 1 Tablet by mouth every six (6) hours as needed for Pain for up to 30 days. Max Daily Amount: 200 mg.

## 2021-09-08 RX ORDER — TRAMADOL HYDROCHLORIDE 50 MG/1
50 TABLET ORAL
Qty: 30 TABLET | Refills: 0 | Status: SHIPPED | OUTPATIENT
Start: 2021-09-08 | End: 2021-10-10 | Stop reason: SDUPTHER

## 2021-09-08 RX ORDER — NALOXONE HYDROCHLORIDE 4 MG/.1ML
SPRAY NASAL
Qty: 1 EACH | Refills: 0 | Status: SHIPPED | OUTPATIENT
Start: 2021-09-08

## 2021-09-08 RX ORDER — DIAZEPAM 5 MG/1
TABLET ORAL
Qty: 30 TABLET | Refills: 0 | Status: SHIPPED | OUTPATIENT
Start: 2021-09-08 | End: 2021-10-10 | Stop reason: SDUPTHER

## 2021-10-10 DIAGNOSIS — F51.01 PRIMARY INSOMNIA: ICD-10-CM

## 2021-10-10 DIAGNOSIS — G43.909 MIGRAINE WITHOUT STATUS MIGRAINOSUS, NOT INTRACTABLE, UNSPECIFIED MIGRAINE TYPE: ICD-10-CM

## 2021-10-10 DIAGNOSIS — M54.2 NECK PAIN: ICD-10-CM

## 2021-10-11 RX ORDER — TRAMADOL HYDROCHLORIDE 50 MG/1
50 TABLET ORAL
Qty: 30 TABLET | Refills: 0 | Status: SHIPPED | OUTPATIENT
Start: 2021-10-11 | End: 2021-11-10

## 2021-10-11 RX ORDER — RIZATRIPTAN BENZOATE 10 MG/1
TABLET, ORALLY DISINTEGRATING ORAL
Qty: 36 TABLET | Refills: 0 | Status: SHIPPED | OUTPATIENT
Start: 2021-10-11 | End: 2021-11-19 | Stop reason: SDUPTHER

## 2021-10-11 RX ORDER — DIAZEPAM 5 MG/1
TABLET ORAL
Qty: 30 TABLET | Refills: 0 | Status: SHIPPED | OUTPATIENT
Start: 2021-10-11 | End: 2021-11-19 | Stop reason: SDUPTHER

## 2021-10-14 ENCOUNTER — TELEPHONE (OUTPATIENT)
Dept: INTERNAL MEDICINE CLINIC | Age: 61
End: 2021-10-14

## 2021-10-14 NOTE — TELEPHONE ENCOUNTER
Submitted a PA for rizatriptan and was denied through cover my med  Prompted to call number provided     This nruse called and they said that patient has no pharmacy benefits   She may use [facility] and it will be $28.27    Patient was called and she said she has pharmacy benefits   She said its usually only $7 with her ins now    Advised her to please call her insurance to check on this     Em lpn

## 2021-11-15 NOTE — TELEPHONE ENCOUNTER
Future Appointments:  No future appointments. Last Appointment With Me:  9/14/2021     Requested Prescriptions     Pending Prescriptions Disp Refills    rizatriptan (MAXALT-MLT) 10 mg disintegrating tablet 36 Tablet 0     Sig: Let 1 tablet dissolve in mouth at onset of headache. May repeat once in 2 hours.  traMADoL (ULTRAM) 50 mg tablet 30 Tablet 0     Sig: Take 1 Tablet by mouth every six (6) hours as needed for Pain for up to 30 days. Max Daily Amount: 200 mg.    diazePAM (VALIUM) 5 mg tablet 30 Tablet 0     Sig: Take 1 tablet by mouth at bedtime as needed for insomnia or back pain.   Indications: muscle spasm Propranolol Pregnancy And Lactation Text: This medication is Pregnancy Category C and it isn't known if it is safe during pregnancy. It is excreted in breast milk.

## 2021-11-19 DIAGNOSIS — F51.01 PRIMARY INSOMNIA: ICD-10-CM

## 2021-11-19 DIAGNOSIS — G43.909 MIGRAINE WITHOUT STATUS MIGRAINOSUS, NOT INTRACTABLE, UNSPECIFIED MIGRAINE TYPE: ICD-10-CM

## 2021-11-20 DIAGNOSIS — G43.909 MIGRAINE WITHOUT STATUS MIGRAINOSUS, NOT INTRACTABLE, UNSPECIFIED MIGRAINE TYPE: Primary | ICD-10-CM

## 2021-11-20 NOTE — TELEPHONE ENCOUNTER
----- Message from Alondra Ruizrp sent at 11/19/2021  4:58 PM EST -----  Regarding: Tramdaol Script  Typically my Tramadol script is listed as one of the medications I can refill. Now it is not listed at all. How do I refill?

## 2021-11-21 RX ORDER — DIAZEPAM 5 MG/1
TABLET ORAL
Qty: 30 TABLET | Refills: 0 | Status: SHIPPED | OUTPATIENT
Start: 2021-11-21 | End: 2022-01-20 | Stop reason: SDUPTHER

## 2021-11-21 RX ORDER — RIZATRIPTAN BENZOATE 10 MG/1
TABLET, ORALLY DISINTEGRATING ORAL
Qty: 36 TABLET | Refills: 0 | Status: SHIPPED | OUTPATIENT
Start: 2021-11-21 | End: 2021-12-15 | Stop reason: SDUPTHER

## 2021-11-21 RX ORDER — TRAMADOL HYDROCHLORIDE 50 MG/1
50 TABLET ORAL
Qty: 30 TABLET | Refills: 0 | Status: SHIPPED | OUTPATIENT
Start: 2021-11-21 | End: 2021-12-15 | Stop reason: SDUPTHER

## 2021-11-22 ENCOUNTER — TELEPHONE (OUTPATIENT)
Dept: INTERNAL MEDICINE CLINIC | Age: 61
End: 2021-11-22

## 2021-12-15 DIAGNOSIS — G43.909 MIGRAINE WITHOUT STATUS MIGRAINOSUS, NOT INTRACTABLE, UNSPECIFIED MIGRAINE TYPE: ICD-10-CM

## 2021-12-15 NOTE — TELEPHONE ENCOUNTER
Future Appointments:  No future appointments. Last Appointment With Me:  9/14/2021     Requested Prescriptions     Pending Prescriptions Disp Refills    rizatriptan (MAXALT-MLT) 10 mg disintegrating tablet 36 Tablet 0     Sig: Let 1 tablet dissolve in mouth at onset of headache. May repeat once in 2 hours.  traMADoL (ULTRAM) 50 mg tablet 30 Tablet 0     Sig: Take 1 Tablet by mouth every six (6) hours as needed for Pain for up to 30 days. Max Daily Amount: 200 mg.

## 2021-12-16 RX ORDER — TRAMADOL HYDROCHLORIDE 50 MG/1
50 TABLET ORAL
Qty: 30 TABLET | Refills: 0 | Status: SHIPPED | OUTPATIENT
Start: 2021-12-16 | End: 2022-01-15

## 2021-12-16 RX ORDER — RIZATRIPTAN BENZOATE 10 MG/1
TABLET, ORALLY DISINTEGRATING ORAL
Qty: 36 TABLET | Refills: 0 | Status: SHIPPED | OUTPATIENT
Start: 2021-12-16

## 2021-12-20 ENCOUNTER — TELEPHONE (OUTPATIENT)
Dept: INTERNAL MEDICINE CLINIC | Age: 61
End: 2021-12-20

## 2022-01-20 DIAGNOSIS — F51.01 PRIMARY INSOMNIA: ICD-10-CM

## 2022-01-20 RX ORDER — DIAZEPAM 5 MG/1
TABLET ORAL
Qty: 30 TABLET | Refills: 0 | Status: SHIPPED | OUTPATIENT
Start: 2022-01-20

## 2022-02-21 RX ORDER — TRAMADOL HYDROCHLORIDE 50 MG/1
50 TABLET ORAL
Qty: 30 TABLET | Refills: 0 | OUTPATIENT
Start: 2022-02-21 | End: 2022-02-24

## 2022-02-21 NOTE — TELEPHONE ENCOUNTER
Future Appointments:  No future appointments. Last Appointment With Me:  9/14/2021     Requested Prescriptions     Pending Prescriptions Disp Refills    traMADoL (ULTRAM) 50 mg tablet 30 Tablet 0     Sig: Take 1 Tablet by mouth every six (6) hours as needed for Pain for up to 3 days. Max Daily Amount: 200 mg. Signed Prescriptions Disp Refills    diazePAM (VALIUM) 5 mg tablet 30 Tablet 0     Sig: Take 1 tablet by mouth at bedtime as needed for insomnia or back pain.   Indications: muscle spasm     Authorizing Provider: Chuck Griggs

## 2022-02-21 NOTE — TELEPHONE ENCOUNTER
Patient is going to need a follow-up appointment in order to get a refill of tramadol. Not seen since January 2021.

## 2022-03-04 ENCOUNTER — TELEPHONE (OUTPATIENT)
Dept: INTERNAL MEDICINE CLINIC | Age: 62
End: 2022-03-04

## 2022-03-04 ENCOUNTER — VIRTUAL VISIT (OUTPATIENT)
Dept: INTERNAL MEDICINE CLINIC | Age: 62
End: 2022-03-04

## 2022-03-04 NOTE — PROGRESS NOTES
1. \"Have you been to the ER, urgent care clinic since your last visit? Hospitalized since your last visit? \" No    2. \"Have you seen or consulted any other health care providers outside of the [address] [place] [address] since your last visit? \" No     3. For patients aged 39-70: Has the patient had a colonoscopy / FIT/ Cologuard? No      If the patient is female:    4. For patients aged 41-77: Has the patient had a mammogram within the past 2 years? No      5. For patients aged 21-65: Has the patient had a pap smear?  No

## 2022-03-04 NOTE — TELEPHONE ENCOUNTER
Please schedule patient for in office follow-up. She is unable to do a virtual visit and did not answer for phone visit.

## 2022-03-04 NOTE — PROGRESS NOTES
This encounter was created in error - please disregard. No answer when patient called for visit.   Went to [facility]

## 2022-03-19 PROBLEM — K63.5 POLYP OF COLON: Status: ACTIVE | Noted: 2017-07-27

## 2022-03-19 PROBLEM — M51.369 DDD (DEGENERATIVE DISC DISEASE), LUMBAR: Status: ACTIVE | Noted: 2019-10-23

## 2022-03-19 PROBLEM — M51.36 DDD (DEGENERATIVE DISC DISEASE), LUMBAR: Status: ACTIVE | Noted: 2019-10-23

## 2022-04-12 ENCOUNTER — PATIENT MESSAGE (OUTPATIENT)
Dept: INTERNAL MEDICINE CLINIC | Age: 62
End: 2022-04-12

## 2022-04-12 NOTE — TELEPHONE ENCOUNTER
----- Message from Kary Major MD sent at 4/12/2022  1:20 PM EDT -----  Patient and I have been unable to connect by video virtual visit and unable to connect by telephone call since she has a blocked number. She will require an in person office visit. Please contact her to schedule.

## 2023-04-06 NOTE — PROGRESS NOTES
Vijaya David is a 58 y.o. female who presents for annual exam.  Last seen in 2021. Was seen at urgent care February 23. Felt poorly, fatigued. States was shaking, whole body hurts. Had labs, brings in COMP- normal. States had other labs, EKG and Xray. Negative work up. Reports that now symptoms come and go. States she has bone pain in upper arms and thighs for 40 years. Lasts for a few minutes and resolves. States she has a lot of fatigue. Weight 147# in 2019, today 124#. Reports it was down to 115#, eating more now. Reports pain behind right knee. No known injury. Less exercise recently. Was followed by Dr. Eliza Wong-, for hypothyroidism, not seeing her now. Was taking armour thyroid 60mg. She has been off thyroid medications for one year. Refuses mammogram.  Last mammogram 2019. Colonoscopy Oct 2017, Dr. Tom Smoke, normal. Normal BM. Denies nausea or abdominal pain. Had EGD in January 2018, , [facility]. Had duodenitis and gastritis. No PPI or H2 blockers. [facility]. Has neck and lower back pain. Xrays spine DDD/DJD. Prior PT. Stopped using a pillow, neck symptoms are better. Not exercising. Off skelaxin. No otc medications for pain. Taking maxalt for migraine, with relief. Reports taking 3 days a week. Has seasonal allergies, using nasal salineDue for eye exam.      Taking vitamin D. Has vocal tremor. Has seen Dr. Ermelinda Mustafa, neurology. Off valium,  using medical marijuana at bedtime, sleep is ok. Last pap, s/p LATRICE/BSO.  2012.         Past Medical History:   Diagnosis Date    Arrhythmia     PAC'S AND PVCS    Arthritis 1970's    neck arthritis    Chronic pain 1970's    Neck pain    Hypothyroid     Migraine, cervicogenic 1970's       Family History   Problem Relation Age of Onset    Cancer Father         metastatic, unknown primary       Social History     Socioeconomic History    Marital status:      Spouse name: Not on file    Number of children: Not on file    Years of education: Not on file    Highest education level: Not on file   Occupational History    Not on file   Tobacco Use    Smoking status: Former     Packs/day: 0.50     Years: 30.00     Pack years: 15.00     Types: Cigarettes    Smokeless tobacco: Never   Substance and Sexual Activity    Alcohol use: No    Drug use: No    Sexual activity: Not on file   Other Topics Concern    Not on file   Social History Narrative    Not on file     Social Determinants of Health     Financial Resource Strain: Low Risk     Difficulty of Paying Living Expenses: Not hard at all   Food Insecurity: No Food Insecurity    Worried About Running Out of Food in the Last Year: Never true    Ran Out of Food in the Last Year: Never true   Transportation Needs: Not on file   Physical Activity: Not on file   Stress: Not on file   Social Connections: Not on file   Intimate Partner Violence: Not on file   Housing Stability: Not on file       Current Outpatient Medications on File Prior to Visit   Medication Sig Dispense Refill    rizatriptan (MAXALT-MLT) 10 mg disintegrating tablet Let 1 tablet dissolve in mouth at onset of headache. May repeat once in 2 hours. 36 Tablet 0    aspirin 81 mg chewable tablet Take 2 Tabs by mouth daily. 100 Tab 0    ascorbic acid, vitamin C, (VITAMIN C) 500 mg tablet Take  by mouth daily. magnesium 250 mg Tab Take  by mouth daily. OTHER,NON-FORMULARY,       diazePAM (VALIUM) 5 mg tablet Take 1 tablet by mouth at bedtime as needed for insomnia or back pain. Indications: muscle spasm (Patient not taking: Reported on 3/4/2022) 30 Tablet 0    naloxone (Narcan) 4 mg/actuation nasal spray Use 1 spray intranasally, then discard. Repeat with new spray every 2 min as needed for opioid overdose symptoms, alternating nostrils. (Patient not taking: Reported on 4/12/2022) 1 Each 0    thyroid, Pork, ([place] Thyroid) 60 mg tablet Take 1 Tab by mouth daily.  (Patient not taking: Reported on 9/14/2021) 30 Tab 5    nicotinic acid (NIACIN) 100 mg tablet Take 400 mg by mouth every other day. (Patient not taking: Reported on 4/7/2023)       No current facility-administered medications on file prior to visit. Review of Systems  Pertinent items are noted in HPI. Objective:     Visit Vitals  BP 98/60   Pulse 80   Temp (!) 96.1 °F (35.6 °C)   Resp 16   Ht 5' 5\" (1.651 m)   Wt 124 lb (56.2 kg)   LMP 01/14/2013   SpO2 99%   BMI 20.63 kg/m²     Gen: well appearing female  HEENT:   PERRL,normal conjunctiva. External ear and canals normal, TMs no opacification or erythema,  OP no erythema, no exudates, MMM  Neck:  No masses or LAD  Resp:  No wheezing, no rhonchi, no rales. CV:  RRR, normal S1S2, no murmur. GI: soft, nontender, without masses. No hepatosplenomegaly. Extrem:  +2 pulses, no edema, warm distally  Neuro: alert ,normal gait. Vocal tremor      Assessment/Plan:       ICD-10-CM ICD-9-CM    1. Routine medical exam  Z00.00 V70.0       2. Vitamin D deficiency  E55.9 268.9       3. Acquired hypothyroidism  E03.9 244.9       4. Osteopenia, unspecified location  M85.80 733.90       5. Hypercholesteremia  E78.00 272.0       6. Acute pain of right knee  M25.561 719.46 XR KNEE RT MAX 2 VWS        Obtain recent labs from ED/urgent care. Recommend heart healthy diet and regular cardiovascular exercise. marisabel GOODWINay right knee.          Kasey Amaral MD

## 2023-04-07 ENCOUNTER — TELEPHONE (OUTPATIENT)
Dept: INTERNAL MEDICINE CLINIC | Age: 63
End: 2023-04-07

## 2023-04-07 ENCOUNTER — TRANSCRIBE ORDER (OUTPATIENT)
Dept: REGISTRATION | Age: 63
End: 2023-04-07

## 2023-04-07 ENCOUNTER — OFFICE VISIT (OUTPATIENT)
Dept: INTERNAL MEDICINE CLINIC | Age: 63
End: 2023-04-07
Payer: COMMERCIAL

## 2023-04-07 ENCOUNTER — HOSPITAL ENCOUNTER (OUTPATIENT)
Dept: GENERAL RADIOLOGY | Age: 63
End: 2023-04-07
Payer: COMMERCIAL

## 2023-04-09 ENCOUNTER — PATIENT MESSAGE (OUTPATIENT)
Dept: INTERNAL MEDICINE CLINIC | Age: 63
End: 2023-04-09

## 2023-04-20 ENCOUNTER — PATIENT MESSAGE (OUTPATIENT)
Dept: INTERNAL MEDICINE CLINIC | Age: 63
End: 2023-04-20

## 2023-04-20 DIAGNOSIS — E55.9 VITAMIN D DEFICIENCY: ICD-10-CM

## 2023-04-20 DIAGNOSIS — E78.00 PURE HYPERCHOLESTEROLEMIA: ICD-10-CM

## 2023-04-20 DIAGNOSIS — E03.9 ACQUIRED HYPOTHYROIDISM: Primary | ICD-10-CM

## 2023-05-02 LAB
25(OH)D3+25(OH)D2 SERPL-MCNC: 41.8 NG/ML (ref 30–100)
CHOLEST SERPL-MCNC: 262 MG/DL (ref 100–199)
HDLC SERPL-MCNC: 67 MG/DL
LDLC SERPL CALC-MCNC: 176 MG/DL (ref 0–99)
T4 FREE SERPL-MCNC: 0.97 NG/DL (ref 0.82–1.77)
TRIGL SERPL-MCNC: 111 MG/DL (ref 0–149)
TSH SERPL DL<=0.005 MIU/L-ACNC: 4.99 UIU/ML (ref 0.45–4.5)
VLDLC SERPL CALC-MCNC: 19 MG/DL (ref 5–40)

## 2023-05-03 DIAGNOSIS — G43.909 MIGRAINE WITHOUT STATUS MIGRAINOSUS, NOT INTRACTABLE, UNSPECIFIED MIGRAINE TYPE: ICD-10-CM

## 2023-05-03 DIAGNOSIS — E03.9 ACQUIRED HYPOTHYROIDISM: ICD-10-CM

## 2023-05-03 RX ORDER — RIZATRIPTAN BENZOATE 10 MG/1
TABLET, ORALLY DISINTEGRATING ORAL
Qty: 36 TABLET | Refills: 0 | Status: SHIPPED | OUTPATIENT
Start: 2023-05-03

## 2023-06-06 ENCOUNTER — TELEPHONE (OUTPATIENT)
Age: 63
End: 2023-06-06

## 2023-06-06 NOTE — TELEPHONE ENCOUNTER
James AMIN Case ID: [id number] - Rx #: [id number] help?  Call us at [phone]  Status  Sent to [employer]  Drug  Rizatriptan Benzoate 10MG dispersible tablets  Form  [place] [employer] Electronic PA Form (2017 N[employer])  Original Claim Info  68 SUBMIT PA REQUEST

## 2023-09-18 RX ORDER — RIZATRIPTAN BENZOATE 10 MG/1
TABLET, ORALLY DISINTEGRATING ORAL
Qty: 36 TABLET | Refills: 3 | Status: SHIPPED | OUTPATIENT
Start: 2023-09-18

## 2023-10-09 ENCOUNTER — TELEPHONE (OUTPATIENT)
Age: 63
End: 2023-10-09

## 2023-10-09 DIAGNOSIS — E03.9 ACQUIRED HYPOTHYROIDISM: Primary | ICD-10-CM

## 2023-10-09 NOTE — TELEPHONE ENCOUNTER
----- Message from [address] [place] [address] sent at 10/9/2023 10:37 AM EDT -----  Regarding: FW: Tonia Thyroid  Contact: [phone]    ----- Message -----  From: Tor Tripathi  Sent: 10/6/2023   5:13 PM EDT  To: #  Subject: Tonia Thyroid                                   I am on 60 but still having the same symptoms of hypothyroid. Can you increase dosage and call in new script?

## 2023-11-24 RX ORDER — THYROID 60 MG
60 TABLET ORAL DAILY
Qty: 30 TABLET | Refills: 3 | Status: SHIPPED | OUTPATIENT
Start: 2023-11-24

## 2024-03-27 RX ORDER — RIZATRIPTAN BENZOATE 10 MG/1
TABLET, ORALLY DISINTEGRATING ORAL
Qty: 36 TABLET | Refills: 3 | OUTPATIENT
Start: 2024-03-27

## 2024-03-29 RX ORDER — RIZATRIPTAN BENZOATE 10 MG/1
TABLET, ORALLY DISINTEGRATING ORAL
Qty: 36 TABLET | Refills: 3 | Status: SHIPPED | OUTPATIENT
Start: 2024-03-29

## 2024-05-07 RX ORDER — THYROID 60 MG
60 TABLET ORAL DAILY
Qty: 30 TABLET | Refills: 3 | Status: SHIPPED | OUTPATIENT
Start: 2024-05-07

## 2024-11-26 NOTE — PROGRESS NOTES
Yaritza Mixon is a 64 y.o. female who presents for annual exam.  Last seen April 2023.    Reports will get BP spikes at times. Will feel heart racing and checks BP. Reports was watching a show, relaxed and felt heart racing.  In the past, had holter monitor, had PAC and PVC, took magnesium and was better.  Is drinking water with added electrolytes, better now.      Taking maxalt for migraine, with relief.     Using estrogen and progesterone, self compounded.      Treated for  hypothyroidism.  Previously followed by endocrinology, Dr. Gloria Chicas, not now.  [place] thyroid 60mg daily. Due for tsh.      [facility] Has neck and lower back pain.  Xrays spine DDD/DJD.  Prior PT.     Has vocal tremor. Has seen Dr. Farhana Thornton, neurology.     Previously on medical marijuana at bedtime for anxiety, last filled April 2024.  Off diazepam.    Not interested in mammogram. Last mammogram 2019.       Normal BM. Colonoscopy Oct 2017, Dr. Peace, normal. No family history of colon cancer.      2019 DEXA scan, osteopenia.  On vitamin D supplement.  Still doing lifeline screening, better test since on HRT and vitamin D.        s/p CJ/BSO.  2012. Normal urination.      No recent eye exam.  Up to date on dental.      Taking aspirin 325 mg daily.  No GI sx.        Past Medical History:   Diagnosis Date    Arrhythmia     PAC'S AND PVCS    Arthritis 1970's    neck arthritis    Chronic pain 1970's    Neck pain    Hypothyroid     Medical marijuana use     Migraine, cervicogenic 1970's       Family History   Problem Relation Age of Onset    Cancer Father         metastatic, unknown primary        Social History     Socioeconomic History    Marital status:      Spouse name: Not on file    Number of children: Not on file    Years of education: Not on file    Highest education level: Not on file   Occupational History    Not on file   Tobacco Use    Smoking status: Former     Current packs/day: 0.50     Average packs/day: 0.5

## 2024-11-27 ENCOUNTER — OFFICE VISIT (OUTPATIENT)
Age: 64
End: 2024-11-27
Payer: COMMERCIAL

## 2024-11-27 VITALS
WEIGHT: 136 LBS | TEMPERATURE: 97.5 F | HEART RATE: 88 BPM | SYSTOLIC BLOOD PRESSURE: 99 MMHG | OXYGEN SATURATION: 98 % | RESPIRATION RATE: 19 BRPM | HEIGHT: 66 IN | DIASTOLIC BLOOD PRESSURE: 66 MMHG | BODY MASS INDEX: 21.86 KG/M2

## 2024-11-27 DIAGNOSIS — Z79.890 CURRENT LONG-TERM USE OF POSTMENOPAUSAL HORMONE REPLACEMENT THERAPY: ICD-10-CM

## 2024-11-27 DIAGNOSIS — E03.9 ACQUIRED HYPOTHYROIDISM: ICD-10-CM

## 2024-11-27 DIAGNOSIS — Z00.00 ROUTINE MEDICAL EXAM: Primary | ICD-10-CM

## 2024-11-27 DIAGNOSIS — E55.9 VITAMIN D DEFICIENCY: ICD-10-CM

## 2024-11-27 LAB
APPEARANCE UR: CLEAR
BILIRUB UR QL: NEGATIVE
COLOR UR: NORMAL
GLUCOSE UR STRIP.AUTO-MCNC: NEGATIVE MG/DL
HGB UR QL STRIP: NEGATIVE
KETONES UR QL STRIP.AUTO: NEGATIVE MG/DL
LEUKOCYTE ESTERASE UR QL STRIP.AUTO: NEGATIVE
NITRITE UR QL STRIP.AUTO: NEGATIVE
PH UR STRIP: 6.5 (ref 5–8)
PROT UR STRIP-MCNC: NEGATIVE MG/DL
SP GR UR REFRACTOMETRY: 1.01 (ref 1–1.03)
SPECIMEN HOLD: NORMAL
UROBILINOGEN UR QL STRIP.AUTO: 0.2 EU/DL (ref 0.2–1)

## 2024-11-27 PROCEDURE — 99396 PREV VISIT EST AGE 40-64: CPT | Performed by: FAMILY MEDICINE

## 2024-11-27 SDOH — ECONOMIC STABILITY: FOOD INSECURITY: WITHIN THE PAST 12 MONTHS, THE FOOD YOU BOUGHT JUST DIDN'T LAST AND YOU DIDN'T HAVE MONEY TO GET MORE.: NEVER TRUE

## 2024-11-27 SDOH — ECONOMIC STABILITY: FOOD INSECURITY: WITHIN THE PAST 12 MONTHS, YOU WORRIED THAT YOUR FOOD WOULD RUN OUT BEFORE YOU GOT MONEY TO BUY MORE.: NEVER TRUE

## 2024-11-27 SDOH — ECONOMIC STABILITY: INCOME INSECURITY: HOW HARD IS IT FOR YOU TO PAY FOR THE VERY BASICS LIKE FOOD, HOUSING, MEDICAL CARE, AND HEATING?: NOT HARD AT ALL

## 2024-11-27 ASSESSMENT — PATIENT HEALTH QUESTIONNAIRE - PHQ9
1. LITTLE INTEREST OR PLEASURE IN DOING THINGS: NOT AT ALL
SUM OF ALL RESPONSES TO PHQ QUESTIONS 1-9: 0
SUM OF ALL RESPONSES TO PHQ9 QUESTIONS 1 & 2: 0
2. FEELING DOWN, DEPRESSED OR HOPELESS: NOT AT ALL

## 2024-11-27 NOTE — PROGRESS NOTES
Patient identified with two identification factors, Name and Date of Birth.    Chief Complaint   Patient presents with    Annual Exam     Experiencing potassium deficiency and acidic urine for 2 months        BP 99/66 (Site: Left Upper Arm, Position: Sitting, Cuff Size: Medium Adult)   Pulse 88   Temp 97.5 °F (36.4 °C) (Temporal)   Resp 19   Ht 1.664 m (5' 5.5\")   Wt 61.7 kg (136 lb)   SpO2 98%   BMI 22.29 kg/m²       1. \"Have you been to the ER, urgent care clinic since your last visit?  Hospitalized since your last visit?\" No     2. \"Have you seen or consulted any other health care providers outside of the [facility] since your last visit?\" No

## 2024-11-28 LAB
25(OH)D3 SERPL-MCNC: >150 NG/ML (ref 30–100)
ALBUMIN SERPL-MCNC: 4.2 G/DL (ref 3.5–5)
ALBUMIN/GLOB SERPL: 1.3 (ref 1.1–2.2)
ALP SERPL-CCNC: 48 U/L (ref 45–117)
ALT SERPL-CCNC: 20 U/L (ref 12–78)
ANION GAP SERPL CALC-SCNC: 7 MMOL/L (ref 2–12)
AST SERPL-CCNC: 21 U/L (ref 15–37)
BASOPHILS # BLD: 0 K/UL (ref 0–0.1)
BASOPHILS NFR BLD: 0 % (ref 0–1)
BILIRUB SERPL-MCNC: 0.7 MG/DL (ref 0.2–1)
BUN SERPL-MCNC: 16 MG/DL (ref 6–20)
BUN/CREAT SERPL: 18 (ref 12–20)
CALCIUM SERPL-MCNC: 9.8 MG/DL (ref 8.5–10.1)
CHLORIDE SERPL-SCNC: 104 MMOL/L (ref 97–108)
CHOLEST SERPL-MCNC: 301 MG/DL
CO2 SERPL-SCNC: 29 MMOL/L (ref 21–32)
CREAT SERPL-MCNC: 0.91 MG/DL (ref 0.55–1.02)
DIFFERENTIAL METHOD BLD: NORMAL
EOSINOPHIL # BLD: 0 K/UL (ref 0–0.4)
EOSINOPHIL NFR BLD: 0 % (ref 0–7)
ERYTHROCYTE [DISTWIDTH] IN BLOOD BY AUTOMATED COUNT: 12.6 % (ref 11.5–14.5)
EST. AVERAGE GLUCOSE BLD GHB EST-MCNC: 100 MG/DL
ESTRADIOL SERPL-MCNC: 83.7 PG/ML
GLOBULIN SER CALC-MCNC: 3.2 G/DL (ref 2–4)
GLUCOSE SERPL-MCNC: 87 MG/DL (ref 65–100)
HBA1C MFR BLD: 5.1 % (ref 4–5.6)
HCT VFR BLD AUTO: 42 % (ref 35–47)
HDLC SERPL-MCNC: 82 MG/DL
HDLC SERPL: 3.7 (ref 0–5)
HGB BLD-MCNC: 14.4 G/DL (ref 11.5–16)
IMM GRANULOCYTES # BLD AUTO: 0 K/UL (ref 0–0.04)
IMM GRANULOCYTES NFR BLD AUTO: 0 % (ref 0–0.5)
LDLC SERPL CALC-MCNC: 183.4 MG/DL (ref 0–100)
LYMPHOCYTES # BLD: 1.9 K/UL (ref 0.8–3.5)
LYMPHOCYTES NFR BLD: 27 % (ref 12–49)
MCH RBC QN AUTO: 30.7 PG (ref 26–34)
MCHC RBC AUTO-ENTMCNC: 34.3 G/DL (ref 30–36.5)
MCV RBC AUTO: 89.6 FL (ref 80–99)
MONOCYTES # BLD: 0.5 K/UL (ref 0–1)
MONOCYTES NFR BLD: 7 % (ref 5–13)
NEUTS SEG # BLD: 4.6 K/UL (ref 1.8–8)
NEUTS SEG NFR BLD: 66 % (ref 32–75)
NRBC # BLD: 0 K/UL (ref 0–0.01)
NRBC BLD-RTO: 0 PER 100 WBC
PLATELET # BLD AUTO: 253 K/UL (ref 150–400)
PMV BLD AUTO: 9.9 FL (ref 8.9–12.9)
POTASSIUM SERPL-SCNC: 3.8 MMOL/L (ref 3.5–5.1)
PROT SERPL-MCNC: 7.4 G/DL (ref 6.4–8.2)
RBC # BLD AUTO: 4.69 M/UL (ref 3.8–5.2)
SODIUM SERPL-SCNC: 140 MMOL/L (ref 136–145)
T4 FREE SERPL-MCNC: 0.9 NG/DL (ref 0.8–1.5)
TRIGL SERPL-MCNC: 178 MG/DL
TSH SERPL DL<=0.05 MIU/L-ACNC: 5.98 UIU/ML (ref 0.36–3.74)
VLDLC SERPL CALC-MCNC: 35.6 MG/DL
WBC # BLD AUTO: 7.1 K/UL (ref 3.6–11)

## 2024-12-02 ENCOUNTER — TELEPHONE (OUTPATIENT)
Age: 64
End: 2024-12-02

## 2024-12-02 DIAGNOSIS — E67.3 HYPERVITAMINOSIS D: Primary | ICD-10-CM

## 2024-12-02 LAB — PROGEST SERPL-MCNC: 0.5 NG/ML

## 2024-12-02 NOTE — TELEPHONE ENCOUNTER
Approved Medications  diazePAM (VALIUM) 5 mg tablet  Take 1 tablet by mouth at bedtime as needed for insomnia or back pain. Disp: 30 Tab Refills: 0    Class: Phone In Start: 10/10/2018   For: Primary insomnia  Approved by: Ace Nair MD  To pharmacy: Not to exceed 5 additional fills before 03/24/2018    Phoned in Diazepam to [facility] per  request. Héctor Shahid with [facility] who gave Haylie Ingram. no

## 2024-12-03 ENCOUNTER — TELEPHONE (OUTPATIENT)
Age: 64
End: 2024-12-03

## 2024-12-03 DIAGNOSIS — E67.3 HYPERVITAMINOSIS D: Primary | ICD-10-CM

## 2024-12-03 DIAGNOSIS — E78.00 PURE HYPERCHOLESTEROLEMIA: ICD-10-CM

## 2025-02-05 ENCOUNTER — TELEPHONE (OUTPATIENT)
Age: 65
End: 2025-02-05

## 2025-02-05 DIAGNOSIS — F51.01 PRIMARY INSOMNIA: Primary | ICD-10-CM

## 2025-02-05 RX ORDER — DIAZEPAM 5 MG/1
5 TABLET ORAL NIGHTLY PRN
Qty: 30 TABLET | Refills: 0 | Status: SHIPPED | OUTPATIENT
Start: 2025-02-05 | End: 2025-03-07

## 2025-05-23 RX ORDER — THYROID,PORK 60 MG
60 TABLET ORAL DAILY
Qty: 30 TABLET | Refills: 3 | Status: SHIPPED | OUTPATIENT
Start: 2025-05-23

## 2025-05-23 RX ORDER — RIZATRIPTAN BENZOATE 10 MG/1
TABLET, ORALLY DISINTEGRATING ORAL
Qty: 9 TABLET | Refills: 3 | Status: SHIPPED | OUTPATIENT
Start: 2025-05-23

## 2025-05-23 NOTE — TELEPHONE ENCOUNTER
1st attempt - attempted to contact pt to get cpe/awv scheduled and to confirm pt's insurance   not applicable

## 2025-05-23 NOTE — TELEPHONE ENCOUNTER
Please schedule annual exam for November.  Patient is 65 and was not Medicare last known problems however she may be Medicare at that time.

## (undated) DEVICE — 3M™ TEGADERM™ TRANSPARENT FILM DRESSING FRAME STYLE, 1624W, 2-3/8 IN X 2-3/4 IN (6 CM X 7 CM), 100/CT 4CT/CASE: Brand: 3M™ TEGADERM™

## (undated) DEVICE — FORCEPS BX L160CM DIA8MM GRSP DISECT CUP TIP NONLOCKING ROT

## (undated) DEVICE — CATHETER IV 22GA L1IN TEF FEP STR HUB INTROCAN SFTY

## (undated) DEVICE — 1200 GUARD II KIT W/5MM TUBE W/O VAC TUBE: Brand: GUARDIAN

## (undated) DEVICE — SOLUTION LACTATED RINGERS INJECTION USP

## (undated) DEVICE — BLOCK BITE ENDOSCP AD 21 MM W/ DIL BLU LF DISP

## (undated) DEVICE — (D)SYR 10ML 1/5ML GRAD NSAF -- PKGING CHANGE USE ITEM 338027

## (undated) DEVICE — ENDO CARRY-ON PROCEDURE KIT INCLUDES ENZYMATIC SPONGE, GAUZE, BIOHAZARD LABEL, TRAY, LUBRICANT, DIRTY SCOPE LABEL, WATER LABEL, TRAY, DRAWSTRING PAD, AND DEFENDO 4-PIECE KIT.: Brand: ENDO CARRY-ON PROCEDURE KIT

## (undated) DEVICE — SOLIDIFIER MEDC 1200ML -- CONVERT TO 356117

## (undated) DEVICE — CONTINU-FLO SOLUTION SET, 2 INJECTION SITES, MALE LUER LOCK ADAPTER WITH RETRACTABLE COLLAR, LARGE BORE STOPCOCK WITH ROTATING MALE LUER LOCK EXTENSION SET, 2 INJECTION SITES, MALE LUER LOCK ADAPTER WITH RETRACTABLE COLLAR: Brand: INTERLINK/CONTINU-FLO

## (undated) DEVICE — KENDALL DL ECG CABLE AND LEAD WIRE SYSTEM, 3-LEAD, SINGLE PATIENT USE: Brand: KENDALL